# Patient Record
Sex: FEMALE | Race: WHITE | NOT HISPANIC OR LATINO | Employment: FULL TIME | URBAN - METROPOLITAN AREA
[De-identification: names, ages, dates, MRNs, and addresses within clinical notes are randomized per-mention and may not be internally consistent; named-entity substitution may affect disease eponyms.]

---

## 2017-01-03 ENCOUNTER — OFFICE VISIT (OUTPATIENT)
Dept: PSYCHOLOGY | Facility: CLINIC | Age: 52
End: 2017-01-03
Payer: COMMERCIAL

## 2017-01-03 DIAGNOSIS — F43.23 ADJUSTMENT DISORDER WITH MIXED ANXIETY AND DEPRESSED MOOD: Primary | ICD-10-CM

## 2017-01-03 PROCEDURE — 90846 FAMILY PSYTX W/O PT 50 MIN: CPT | Performed by: MARRIAGE & FAMILY THERAPIST

## 2017-01-03 NOTE — PROGRESS NOTES
"                                             Progress Note    Client Name: Elvia Howe  Date: 1/3/17         Service Type: Family without client present      Session Start Time: 7:07  Session End Time: 7:54      Session Length: 53+     Session #: 3     Attendees:  Thiago Wei and son Thiago Nicole    Treatment Plan Last Reviewed: n/a  PHQ-9 / HEATH-7 : not completed     DATA      Progress Since Last Session (Related to Symptoms / Goals / Homework):   Symptoms: Stable    Homework: n/a      Episode of Care Goals: Minimal progress - ACTION (Actively working towards change); Intervened by reinforcing change plan / affirming steps taken     Current / Ongoing Stressors and Concerns:   Client's  and son attended session.  Son arrived 20 minutes late which surprised  as he assumed son was not coming.  Both identified strain in the father/son relationship.  Father desires more shared positive experience and communication.  Son thinks this isn't an issue and identified believing that parents are hurt and upset that son didn't finish college.  Father denied this and said he has worked through the hurt and now is just wanting son to \"move forward\" in whatever way he chooses.     Treatment Objective(s) Addressed in This Session:   Assessment, relationship dynamics     Intervention:   Validation, hearing others' perspectives        ASSESSMENT: Current Emotional / Mental Status (status of significant symptoms):   Risk status (Self / Other harm or suicidal ideation)   Client not present in session to assess.        Medication Review:   No changes to current psychiatric medication(s)     Medication Compliance:   Yes     Changes in Health Issues:   None reported     Chemical Use Review:   Substance Use: Chemical use reviewed, no active concerns identified      Tobacco Use: No current tobacco use.       Collateral Reports Completed:   Not Applicable    PLAN: (Client Tasks / Therapist Tasks / Other)  Client will " work to listen and interrupt less.   will offer feedback to client about how this goes.      Sr. Thiago and Jr. Thiago will determine who attends next session.  They will consider ways to strengthen their relationship.       Marilee Isidro, LMFT                                                         ________________________________________________________________________    Treatment Plan    Client's Name: Elvia Howe  YOB: 1965    Date: 11/25/16    Diagnoses: Adjustment Disorders  309.28 (F43.23) With mixed anxiety and depressed mood  Psychosocial & Contextual Factors: family strain  WHODAS: 14    Referral / Collaboration:  Referral to another professional/service is not indicated at this time..    Anticipated number of session or this episode of care: 6-12      MeasurableTreatment Goal(s) related to diagnosis / functional impairment(s)  Goal 1: Client will report reduction in anxiety also as evidenced by reduction of HEATH-7 score below 6 points within the next 12 weeks.    Objective #A (Client Action)    Client will identify at least three triggers for anxiety.  Status: New - Date: 11/25/16     Intervention(s)  Therapist will provide educational materials on common triggers and signs of anxiety.    Objective #B  Client will use relaxation strategies at least two times per day to reduce the physical symptoms of anxiety.  Status: New - Date: 11/25/16     Intervention(s)  Therapist will teach relaxation strategies such as mindfulness, deep breathing, muscle relaxation, and sensory activities.    Objective #C  Client will use cognitive strategies identified in therapy to challenge anxious thoughts.  Status: New - Date: 11/25/16     Intervention(s)  Therapist will teach strategies for cognitive modification using REBT model.      Goal 2: Client will eliminate ineffective communication and verbal aggression with family members.    Objective #A (Client Action)    Client will practice the  use of timeouts.  Status: New: 11/25/16     Intervention(s)  Therapist will teach rules for taking a timeout. Practice/role-play in session.  Assign homework for practice.    Objective #B  Client will learn & utilize at least five assertive communication skills weekly.    Status: New: 11/25/16     Intervention(s)  Therapist will teach assertiveness skills. Role-play in session.    Objective #C  Client will track and record at least daily pleasant exchanges with family members.  Status: New - Date: 11/25/16     Intervention(s)  Therapist will assign homework for practice at home.      Client have reviewed and agreed to the above plan.      Marilee Isidro, LMFT  November 25, 2016

## 2017-01-06 ENCOUNTER — OFFICE VISIT (OUTPATIENT)
Dept: FAMILY MEDICINE | Facility: CLINIC | Age: 52
End: 2017-01-06
Payer: COMMERCIAL

## 2017-01-06 VITALS
HEART RATE: 76 BPM | DIASTOLIC BLOOD PRESSURE: 70 MMHG | TEMPERATURE: 98.6 F | SYSTOLIC BLOOD PRESSURE: 136 MMHG | HEIGHT: 64 IN | OXYGEN SATURATION: 98 %

## 2017-01-06 DIAGNOSIS — Z01.818 PREOP GENERAL PHYSICAL EXAM: Primary | ICD-10-CM

## 2017-01-06 DIAGNOSIS — M54.2 NECK PAIN: ICD-10-CM

## 2017-01-06 DIAGNOSIS — J45.40 MODERATE PERSISTENT ASTHMA, UNCOMPLICATED: ICD-10-CM

## 2017-01-06 DIAGNOSIS — F41.9 ANXIETY: ICD-10-CM

## 2017-01-06 DIAGNOSIS — F33.1 MAJOR DEPRESSIVE DISORDER, RECURRENT EPISODE, MODERATE (H): ICD-10-CM

## 2017-01-06 DIAGNOSIS — M92.61 HAGLUND'S DEFORMITY, RIGHT: ICD-10-CM

## 2017-01-06 LAB
BASOPHILS # BLD AUTO: 0 10E9/L (ref 0–0.2)
BASOPHILS NFR BLD AUTO: 0.2 %
DIFFERENTIAL METHOD BLD: NORMAL
EOSINOPHIL # BLD AUTO: 0.1 10E9/L (ref 0–0.7)
EOSINOPHIL NFR BLD AUTO: 1.9 %
ERYTHROCYTE [DISTWIDTH] IN BLOOD BY AUTOMATED COUNT: 12.8 % (ref 10–15)
HCT VFR BLD AUTO: 40.5 % (ref 35–47)
HGB BLD-MCNC: 13.8 G/DL (ref 11.7–15.7)
LYMPHOCYTES # BLD AUTO: 2.1 10E9/L (ref 0.8–5.3)
LYMPHOCYTES NFR BLD AUTO: 32.6 %
MCH RBC QN AUTO: 28.2 PG (ref 26.5–33)
MCHC RBC AUTO-ENTMCNC: 34.1 G/DL (ref 31.5–36.5)
MCV RBC AUTO: 83 FL (ref 78–100)
MONOCYTES # BLD AUTO: 0.5 10E9/L (ref 0–1.3)
MONOCYTES NFR BLD AUTO: 7.7 %
NEUTROPHILS # BLD AUTO: 3.7 10E9/L (ref 1.6–8.3)
NEUTROPHILS NFR BLD AUTO: 57.6 %
PLATELET # BLD AUTO: 200 10E9/L (ref 150–450)
RBC # BLD AUTO: 4.9 10E12/L (ref 3.8–5.2)
WBC # BLD AUTO: 6.4 10E9/L (ref 4–11)

## 2017-01-06 PROCEDURE — 36415 COLL VENOUS BLD VENIPUNCTURE: CPT | Performed by: FAMILY MEDICINE

## 2017-01-06 PROCEDURE — 99214 OFFICE O/P EST MOD 30 MIN: CPT | Performed by: FAMILY MEDICINE

## 2017-01-06 PROCEDURE — 85025 COMPLETE CBC W/AUTO DIFF WBC: CPT | Performed by: FAMILY MEDICINE

## 2017-01-06 RX ORDER — BUPROPION HYDROCHLORIDE 150 MG/1
150 TABLET ORAL EVERY MORNING
Qty: 90 TABLET | Refills: 1 | Status: SHIPPED | OUTPATIENT
Start: 2017-01-06 | End: 2017-03-07

## 2017-01-06 RX ORDER — CITALOPRAM HYDROBROMIDE 40 MG/1
40 TABLET ORAL DAILY
Qty: 90 TABLET | Refills: 1 | Status: SHIPPED | OUTPATIENT
Start: 2017-01-06 | End: 2017-03-10

## 2017-01-06 RX ORDER — ALBUTEROL SULFATE 90 UG/1
2 AEROSOL, METERED RESPIRATORY (INHALATION) EVERY 4 HOURS PRN
Qty: 6 INHALER | Refills: 0 | Status: SHIPPED | OUTPATIENT
Start: 2017-01-06 | End: 2018-01-12

## 2017-01-06 ASSESSMENT — ANXIETY QUESTIONNAIRES
1. FEELING NERVOUS, ANXIOUS, OR ON EDGE: NOT AT ALL
7. FEELING AFRAID AS IF SOMETHING AWFUL MIGHT HAPPEN: NOT AT ALL
5. BEING SO RESTLESS THAT IT IS HARD TO SIT STILL: NOT AT ALL
2. NOT BEING ABLE TO STOP OR CONTROL WORRYING: SEVERAL DAYS
IF YOU CHECKED OFF ANY PROBLEMS ON THIS QUESTIONNAIRE, HOW DIFFICULT HAVE THESE PROBLEMS MADE IT FOR YOU TO DO YOUR WORK, TAKE CARE OF THINGS AT HOME, OR GET ALONG WITH OTHER PEOPLE: NOT DIFFICULT AT ALL
3. WORRYING TOO MUCH ABOUT DIFFERENT THINGS: NOT AT ALL
GAD7 TOTAL SCORE: 2
6. BECOMING EASILY ANNOYED OR IRRITABLE: SEVERAL DAYS

## 2017-01-06 ASSESSMENT — PATIENT HEALTH QUESTIONNAIRE - PHQ9: 5. POOR APPETITE OR OVEREATING: NOT AT ALL

## 2017-01-06 NOTE — Clinical Note
My Depression Action Plan  Name: Elvia Howe   Date of Birth 1965  Date: 1/6/2017    My doctor: Josiah Salamanca   My clinic: [address] [address] [phone]          GREEN    ZONE   Good Control    What it looks like:     Things are going generally well. You have normal up s and down s. You may even feel depressed from time to time, but bad moods usually last less than a day.   What you need to do:  1. Continue to care for yourself (see self care plan)  2. Check your depression survival kit and update it as needed  3. Follow your physician s recommendations including any medication.  4. Do not stop taking medication unless you consult with your physician first.           YELLOW         ZONE Getting Worse    What it looks like:     Depression is starting to interfere with your life.     It may be hard to get out of bed; you may be starting to isolate yourself from others.    Symptoms of depression are starting to last most all day and this has happened for several days.     You may have suicidal thoughts but they are not constant.   What you need to do:     1. Call your care team, your response to treatment will improve if you keep your care team informed of your progress. Yellow periods are signs an adjustment may need to be made.     2. Continue your self-care, even if you have to fake it!    3. Talk to someone in your support network    4. Open up your depression survival kit           RED    ZONE Medical Alert - Get Help    What it looks like:     Depression is seriously interfering with your life.     You may experience these or other symptoms: You can t get out of bed most days, can t work or engage in other necessary activities, you have trouble taking care of basic hygiene, or basic responsibilities, thoughts of suicide or death that will not go away, self-injurious behavior.     What you need to do:  1. Call your care team and  request a same-day appointment. If they are not available (weekends or after hours) call your local crisis line, emergency room or 911.      Electronically signed by: TALIA NAVARRO, January 6, 2017    Depression Self Care Plan / Survival Kit    Self-Care for Depression  Here s the deal. Your body and mind are really not as separate as most people think.  What you do and think affects how you feel and how you feel influences what you do and think. This means if you do things that people who feel good do, it will help you feel better.  Sometimes this is all it takes.  There is also a place for medication and therapy depending on how severe your depression is, so be sure to consult with your medical provider and/ or Behavioral Health Consultant if your symptoms are worsening or not improving.     In order to better manage my stress, I will:    Exercise  Get some form of exercise, every day. This will help reduce pain and release endorphins, the  feel good  chemicals in your brain. This is almost as good as taking antidepressants!  This is not the same as joining a gym and then never going! (they count on that by the way ) It can be as simple as just going for a walk or doing some gardening, anything that will get you moving.      Hygiene   Maintain good hygiene (Get out of bed in the morning, Make your bed, Brush your teeth, Take a shower, and Get dressed like you were going to work, even if you are unemployed).  If your clothes don't fit try to get ones that do.    Diet  I will strive to eat foods that are good for me, drink plenty of water, and avoid excessive sugar, caffeine, alcohol, and other mood-altering substances.  Some foods that are helpful in depression are: complex carbohydrates, B vitamins, flaxseed, fish or fish oil, fresh fruits and vegetables.    Psychotherapy  I agree to participate in Individual Therapy (if recommended).    Medication  If prescribed medications, I agree to take them.  Missing doses  can result in serious side effects.  I understand that drinking alcohol, or other illicit drug use, may cause potential side effects.  I will not stop my medication abruptly without first discussing it with my provider.    Staying Connected With Others  I will stay in touch with my friends, family members, and my primary care provider/team.    Use your imagination  Be creative.  We all have a creative side; it doesn t matter if it s oil painting, sand castles, or mud pies! This will also kick up the endorphins.    Witness Beauty  (AKA stop and smell the roses) Take a look outside, even in mid-winter. Notice colors, textures. Watch the squirrels and birds.     Service to others  Be of service to others.  There is always someone else in need.  By helping others we can  get out of ourselves  and remember the really important things.  This also provides opportunities for practicing all the other parts of the program.    Humor  Laugh and be silly!  Adjust your TV habits for less news and crime-drama and more comedy.    Control your stress  Try breathing deep, massage therapy, biofeedback, and meditation. Find time to relax each day.     My support system    Clinic Contact:  Phone number:    Contact 1:  Phone number:    Contact 2:  Phone number:    [employer]/:  Phone number:    Therapist:  Phone number:    Local crisis center:    Phone number:    Other community support:  Phone number:

## 2017-01-06 NOTE — Clinical Note
My Asthma Action Plan  Name: Elvia Howe   YOB: 1965  Date: 1/6/2017   My doctor: Josiah Salamanca   My clinic: [facility]      My Control Medicine: Fluticasone+salmeterol (Advair) -           My Rescue Medicine: Albuterol (Proair/Ventolin/Proventil) HFA        My Asthma Severity: moderate persistent          GREEN ZONE   Good Control    I feel good    No cough or wheeze    Can work, sleep and play without asthma symptoms       Take your asthma control medicine every day.     1. If exercise triggers your asthma, take your rescue medication    15 minutes before exercise or sports, and    During exercise if you have asthma symptoms  2. Spacer to use with inhaler: If you have a spacer, make sure to use it with your inhaler             YELLOW ZONE Getting Worse  I have ANY of these:    I do not feel good    Cough or wheeze    Chest feels tight    Wake up at night   1. Keep taking your Green Zone medications  2. Start taking your rescue medicine:    every 20 minutes for up to 1 hour. Then every 4 hours for 24-48 hours.  3. If you stay in the Yellow Zone for more than 12-24 hours, contact your doctor.  4. If you do not return to the Green Zone in 12-24 hours or you get worse, start taking your oral steroid medicine if prescribed by your provider.           RED ZONE Medical Alert - Get Help  I have ANY of these:    I feel awful    Medicine is not helping    Breathing getting harder    Trouble walking or talking    Nose opens wide to breathe       1. Take your rescue medicine NOW  2. If your provider has prescribed an oral steroid medicine, start taking it NOW  3. Call your doctor NOW  4. If you are still in the Red Zone after 20 minutes and you have not reached your doctor:    Take your rescue medicine again and    Call 911 or go to the emergency room right away    See your regular doctor within 2 weeks of an Emergency Room or Urgent Care visit for follow-up treatment.             Electronically signed by: TALIA NAVARRO, January 6, 2017    Annual Reminders:  Meet with Asthma Educator,  Flu Shot in the Fall, consider Pneumonia Vaccination for patients with asthma (aged 19 and older).    Pharmacy: [facility] [address] [place] [address] [facility] AT [address]                    Asthma Triggers  How To Control Things That Make Your Asthma Worse    Triggers are things that make your asthma worse.  Look at the list below to help you find your triggers and what you can do about them.  You can help prevent asthma flare-ups by staying away from your triggers.      Trigger                                                          What you can do   Cigarette Smoke  Tobacco smoke can make asthma worse. Do not allow smoking in your home, car or around you.  Be sure no one smokes at a child s day care or school.  If you smoke, ask your health care provider for ways to help you quit.  Ask family members to quit too.  Ask your health care provider for a referral to Quit Plan to help you quit smoking, or call 0-095-491-PLAN.     Colds, Flu, Bronchitis  These are common triggers of asthma. Wash your hands often.  Don t touch your eyes, nose or mouth.  Get a flu shot every year.     Dust Mites  These are tiny bugs that live in cloth or carpet. They are too small to see. Wash sheets and blankets in hot water every week.   Encase pillows and mattress in dust mite proof covers.  Avoid having carpet if you can. If you have carpet, vacuum weekly.   Use a dust mask and HEPA vacuum.   Pollen and Outdoor Mold  Some people are allergic to trees, grass, or weed pollen, or molds. Try to keep your windows closed.  Limit time out doors when pollen count is high.   Ask you health care provider about taking medicine during allergy season.     Animal Dander  Some people are allergic to skin flakes, urine or saliva from pets with fur or feathers. Keep pets with fur or feathers out of  your home.    If you can t keep the pet outdoors, then keep the pet out of your bedroom.  Keep the bedroom door closed.  Keep pets off cloth furniture and away from stuffed toys.     Mice, Rats, and Cockroaches  Some people are allergic to the waste from these pests.   Cover food and garbage.  Clean up spills and food crumbs.  Store grease in the refrigerator.   Keep food out of the bedroom.   Indoor Mold  This can be a trigger if your home has high moisture. Fix leaking faucets, pipes, or other sources of water.   Clean moldy surfaces.  Dehumidify basement if it is damp and smelly.   Smoke, Strong Odors, and Sprays  These can reduce air quality. Stay away from strong odors and sprays, such as perfume, powder, hair spray, paints, smoke incense, paint, cleaning products, candles and new carpet.   Exercise or Sports  Some people with asthma have this trigger. Be active!  Ask your doctor about taking medicine before sports or exercise to prevent symptoms.    Warm up for 5-10 minutes before and after sports or exercise.     Other Triggers of Asthma  Cold air:  Cover your nose and mouth with a scarf.  Sometimes laughing or crying can be a trigger.  Some medicines and food can trigger asthma.

## 2017-01-06 NOTE — MR AVS SNAPSHOT
After Visit Summary   1/6/2017    Elvia Howe    MRN: [id number]           Patient Information     Date Of Birth          1965        Visit Information        Provider Department      1/6/2017 2:30 PM Josiah Salamanca MD [facility]        Today's Diagnoses     Preop general physical exam    -  1     Kimmy's deformity, right         Anxiety         Major depressive disorder, recurrent episode, moderate (H)         Moderate persistent asthma, uncomplicated         Neck pain           Care Instructions    1. Do not take Fish Oil, NSAID's like Aspirin, Ibuprofen, Naproxen etc, one week prior to your surgery. You may resume these after your surgery.    2. Continue your other medications but on the day of surgery use only a sip of water to take these.    3. Come back to see me after you heal from surgery to discuss weight and other issues. If you come fasting we can do some labs.         Follow-ups after your visit        Your next 10 appointments already scheduled     Feb 01, 2017  7:00 AM   Return Visit with KAYLYNN Barron   [facility] ([facility])    [address] [facility] [address] [phone]              Who to contact     If you have questions or need follow up information about today's clinic visit or your schedule please contact [facility] directly at [phone].  Normal or non-critical lab and imaging results will be communicated to you by MyChart, letter or phone within 4 business days after the clinic has received the results. If you do not hear from us within 7 days, please contact the clinic through MyChart or phone. If you have a critical or abnormal lab result, we will notify you by phone as soon as possible.  Submit refill requests through [employer] or call your pharmacy and they will forward the refill request to us. Please allow 3 business days for your refill to be completed.          Additional  "Information About Your Visit        MyChart Information     [facility] lets you send messages to your doctor, view your test results, renew your prescriptions, schedule appointments and more. To sign up, go to [email or web address] . Click on \"Log in\" on the left side of the screen, which will take you to the Welcome page. Then click on \"Sign up Now\" on the right side of the page.     You will be asked to enter the access code listed below, as well as some personal information. Please follow the directions to create your username and password.     Your access code is: [email or web address]  Expires: 2017  3:18 PM     Your access code will  in 90 days. If you need help or a new code, please call your [facility] clinic or [phone].        Care EveryWhere ID     This is your Care EveryWhere ID. This could be used by other organizations to access your [facility] medical records  [id number]        Your Vitals Were     Pulse Temperature Height Pulse Oximetry          76 98.6  F (37  C) (Oral) 5' 3.75\" (1.619 m) 98%         Blood Pressure from Last 3 Encounters:   17 136/70   16 131/78   09/24/15 128/76    Weight from Last 3 Encounters:   16 230 lb (104.327 kg)   09/24/15 242 lb (109.77 kg)   08/03/15 260 lb (117.935 kg)              We Performed the Following     Asthma Action Plan (AAP)     CBC with platelets differential          Today's Medication Changes          These changes are accurate as of: 17  3:18 PM.  If you have any questions, ask your nurse or doctor.               Start taking these medicines.        Dose/Directions    tiZANidine 4 MG tablet   Commonly known as:  ZANAFLEX   Used for:  Neck pain   Started by:  Josiah Salamanca MD        Dose:  4 mg   Take 1 tablet (4 mg) by mouth 2 times daily as needed for muscle spasms   Quantity:  60 tablet   Refills:  3         These medicines have changed or have updated prescriptions.        Dose/Directions    buPROPion 150 MG 24 hr tablet "   Commonly known as:  WELLBUTRIN XL   This may have changed:  additional instructions   Used for:  Major depressive disorder, recurrent episode, moderate (H), Anxiety   Changed by:  Josiah Salamanca MD        Dose:  150 mg   Take 1 tablet (150 mg) by mouth every morning   Quantity:  90 tablet   Refills:  1       citalopram 40 MG tablet   Commonly known as:  celeXA   This may have changed:  additional instructions   Used for:  Anxiety, Major depressive disorder, recurrent episode, moderate (H)   Changed by:  Josiah Salamanca MD        Dose:  40 mg   Take 1 tablet (40 mg) by mouth daily   Quantity:  90 tablet   Refills:  1       fluticasone-salmeterol 250-50 MCG/DOSE diskus inhaler   Commonly known as:  ADVAIR DISKUS   This may have changed:  additional instructions   Used for:  Moderate persistent asthma, uncomplicated   Changed by:  Josiah Salamanca MD        Dose:  1 puff   Inhale 1 puff into the lungs every 12 hours   Quantity:  1 Inhaler   Refills:  3         Stop taking these medicines if you haven't already. Please contact your care team if you have questions.     phentermine 37.5 MG capsule   Stopped by:  Josiah Salamanca MD           scopolamine 72 hr patch   Commonly known as:  TRANSDERM   Stopped by:  Josiah Salamanca MD                Where to get your medicines      These medications were sent to [facility] [address] [place] [address] [address] [place] [address] AT [facility]  [address] [facility] [address]     Phone:  [phone]    - buPROPion 150 MG 24 hr tablet  - citalopram 40 MG tablet  - fluticasone-salmeterol 250-50 MCG/DOSE diskus inhaler  - tiZANidine 4 MG tablet      Some of these will need a paper prescription and others can be bought over the counter.  Ask your nurse if you have questions.     Bring a paper prescription for each of these medications    - albuterol 108 (90 BASE) MCG/ACT Inhaler             Primary Care Provider Office Phone # Fax #     Josiah Salamanca [phone]       [facility] [address] [facility] [address]        Thank you!     Thank you for choosing [facility]  for your care. Our goal is always to provide you with excellent care. Hearing back from our patients is one way we can continue to improve our services. Please take a few minutes to complete the written survey that you may receive in the mail after your visit with us. Thank you!             Your Updated Medication List - Protect others around you: Learn how to safely use, store and throw away your medicines at www.disposemymeds.org.          This list is accurate as of: 1/6/17  3:18 PM.  Always use your most recent med list.                   Brand Name Dispense Instructions for use    albuterol 108 (90 BASE) MCG/ACT Inhaler    PROAIR HFA/PROVENTIL HFA/VENTOLIN HFA    6 Inhaler    Inhale 2 puffs into the lungs every 4 hours as needed for shortness of breath / dyspnea or wheezing       ALBUTEROL IN          buPROPion 150 MG 24 hr tablet    WELLBUTRIN XL    90 tablet    Take 1 tablet (150 mg) by mouth every morning       citalopram 40 MG tablet    celeXA    90 tablet    Take 1 tablet (40 mg) by mouth daily       fluticasone-salmeterol 250-50 MCG/DOSE diskus inhaler    ADVAIR DISKUS    1 Inhaler    Inhale 1 puff into the lungs every 12 hours       loratadine 10 MG capsule     90 capsule    Take 10 mg by mouth daily       MODAFINIL PO          order for DME     1 each    Equipment being ordered: cpap tubing, mask, filters, and head gear for lifetime.       tiZANidine 4 MG tablet    ZANAFLEX    60 tablet    Take 1 tablet (4 mg) by mouth 2 times daily as needed for muscle spasms

## 2017-01-06 NOTE — PATIENT INSTRUCTIONS
1. Do not take Fish Oil, NSAID's like Aspirin, Ibuprofen, Naproxen etc, one week prior to your surgery. You may resume these after your surgery.    2. Continue your other medications but on the day of surgery use only a sip of water to take these.    3. Come back to see me after you heal from surgery to discuss weight and other issues. If you come fasting we can do some labs.

## 2017-01-06 NOTE — PROGRESS NOTES
[facility]  [address] [facility] [address]  [phone]  Dept: [phone]    PRE-OP EVALUATION:  Today's date: 2017    Elvia Howe (: 1965) presents for pre-operative evaluation assessment as requested by Dr. Dyer.  She requires evaluation and anesthesia risk assessment prior to undergoing surgery/procedure for treatment of right heel.     Date of Surgery/ Procedure: 2017  Time of Surgery/ Procedure:   Hospital/Surgical Facility: [facility] [place]  Fax number for surgical facility: [phone]  Primary Physician: Josiah Salamanca  Type of Anesthesia Anticipated: to be determined    Patient has a Health Care Directive or Living Will:  NO    1. NO - Do you have a history of heart attack, stroke, stent, bypass or surgery on an artery in the head, neck, heart or legs?  2. NO - Do you ever have any pain or discomfort in your chest?  3. NO - Do you have a history of  Heart Failure?  4. NO - Are you troubled by shortness of breath when: walking on the level, up a slight hill or at night?  5. NO - Do you currently have a cold, bronchitis or other respiratory infection?  6. NO - Do you have a cough, shortness of breath or wheezing?  7. NO - Do you sometimes get pains in the calves of your legs when you walk?  8. NO - Do you or anyone in your family have previous history of blood clots?  9. NO - Do you or does anyone in your family have a serious bleeding problem such as prolonged bleeding following surgeries or cuts?  10. NO - Have you ever had problems with anemia or been told to take iron pills?  11. NO - Have you had any abnormal blood loss such as black, tarry or bloody stools, or abnormal vaginal bleeding?  12. yes - Have you ever had a blood transfusion?  13. NO - Have you or any of your relatives ever had problems with anesthesia?  14. yes - Do you have sleep apnea, excessive snoring or daytime drowsiness?  15. NO - Do you have any prosthetic heart  valves?  16. NO - Do you have prosthetic joints?  17. NO - Is there any chance that you may be pregnant?      HPI:                                                      Right Kimmy deformity - she has had pain and swelling for many months. She is now scheduled for surgery on 1/18/17.    Obesity - diet and exercise discussed. Declines referral to nutrition. Phentermine previously every 2 days was working well. If she takes it daily then she can't think clearly. She would not let us weigh her today.    Wt Readings from Last 5 Encounters:   03/09/16 230 lb (104.327 kg)   09/24/15 242 lb (109.77 kg)   08/03/15 260 lb (117.935 kg)   08/28/13 255 lb (115.667 kg)   08/23/13 250 lb (113.399 kg)     07/29/13  258 lb (117.028 kg)      01/24/13   276 lb (125.193 kg)       Asthma -     Duration: since around 2002  Severity: moderate persistent  Asthma symptoms: Cough, shortness of breath, wheezing.  H/O hospitalization: no  H/O steroid treatment: yes  ACT every 6 months: 19 today 8/3/15  Yearly Action plan: 8/3/15  Smoker: no  Rescue inhaler use: rarely  Preventive treatment: Advair 250/50, one bid - gets from [facility] - but has been out of this recently  Side effects: denies  Triggers: environmental allergies  Compliant: yes  Counseling: previously done today about the detriments of uncontrolled asthma, proper inhaler technique.    ACT Total Scores 3/9/2016   ACT TOTAL SCORE -   ASTHMA ER VISITS -   ASTHMA HOSPITALIZATIONS -   ACT TOTAL SCORE (Goal Greater than or Equal to 20) 22   In the past 12 months, how many times did you visit the emergency room for your asthma without being admitted to the hospital? 0   In the past 12 months, how many times were you hospitalized overnight because of your asthma? 0         Environmental Allergies - her symptoms are year round and include itchy, runny eyes, nose, sneezing. Loratadine helps. Zyrtec and Singulair no help. I had referred her to allergy but she did not go.      Anxiety/depression - Fair control on Celexa 40 mg qd and Wellbutrin 150 mg qd. No longer on Buspar - not needed. No side effects.     PHQ-9 SCORE 8/3/2015 3/9/2016 1/6/2017   Total Score 8 - -   Total Score - 3 0     HEATH-7 SCORE 8/3/2015 3/9/2016 1/6/2017   Total Score 9 - -   Total Score - 4 2       Sleep apnea - stable on CPAP at night. Follows with sleep medicine.    Neck pain - present a couple of weeks without trauma. She would like to try muscle relaxer.    Knee pain - had steroid shots per ortho.     Fatigue and insomnia - has seen seen by sleep. Provigil is working well.      Vit D 2000 units per day but has not been taking it. I encouraged her to do that.     Diarrhea - She tells me her symptoms have resolved since using probiotics. Not needing any more.    Previous h/o sea sickness - Scop patch prn helps.      Preventive -    Immunization History   Administered Date(s) Administered     Hepatitis B 06/13/2011, 11/08/2011     Influenza (IIV3) 10/08/2012     Influenza Vaccine IM 3yrs+ 4 Valent IIV4 12/11/2014, 11/04/2015, 11/09/2016     Pneumococcal 23 valent 11/08/2011, 10/08/2012     TDAP (ADACEL AGES 11-64) 06/13/2011     She recalls that she [facility]. She declines another mammogram.    Lipids screen:    Recent Labs   Lab Test  09/24/15   1117  08/28/13   1119   CHOL  172  169   HDL  51  50   LDL  102  105   TRIG  95  69   CHOLHDLRATIO  3.4  3.4       PAP - has had hysterectomy, still has cervix - declines PAP.    SH:    Marital status:   Kids: 2  Employment: life insurance exams  Exercise: swimming 4 days per week for 30 minutes per time.  Tobacco: no  Etoh: 1 every 2 months  Recreational drugs: denies  Caffeine: 3 pops per day.      MEDICAL HISTORY:                                                      Patient Active Problem List    Diagnosis Date Noted     Sleep apnea, unspecified type 11/03/2016     Priority: Medium     Adjustment disorder with  mixed anxiety and depressed mood 2016     Priority: Medium     Motion sickness, initial encounter 2016     Priority: Medium     Obesity 2015     Priority: Medium     Moderate major depression (H) 2014     Priority: Medium     Environmental allergies 2012     Priority: Medium     Epicondylitis, lateral humeral 2012     Priority: Medium     Anxiety 2011     Priority: Medium     Moderate persistent asthma 2011     Priority: Medium     CARDIOVASCULAR SCREENING; LDL GOAL LESS THAN 160 10/31/2010     Priority: Medium      Past Medical History   Diagnosis Date     Anemia      Obesity      Sleep apnea, obstructive      CPAP     Anxiety      Asthma      Heart murmur      echo  mild mitral and mild tricuspid regurgitation otherwise normal     Claustrophobia      Arthritis      Past Surgical History   Procedure Laterality Date     C/section, low transverse       , Low Transverse x2     Carpal tunnel release rt/lt       Surgical history of -        wisdom teeth     Hysterectomy, pap no longer indicated       fibroids, one ovary remains.     Current Outpatient Prescriptions   Medication Sig Dispense Refill     citalopram (CELEXA) 40 MG tablet Take 1 tablet (40 mg) by mouth daily Appointment needed for further refills. 30 tablet 0     buPROPion (WELLBUTRIN XL) 150 MG 24 hr tablet Take 1 tablet (150 mg) by mouth every morning Appointment needed for further refills. 30 tablet 0     order for DME Equipment being ordered: cpap tubing, mask, filters, and head gear for lifetime. 1 each 0     fluticasone-salmeterol (ADVAIR DISKUS) 250-50 MCG/DOSE diskus inhaler Inhale 1 puff into the lungs every 12 hours Needs office visit for further refill. 1 Inhaler 0     MODAFINIL PO        loratadine 10 MG capsule Take 10 mg by mouth daily 90 capsule 3     scopolamine (TRANSDERM) (1.5mg base/3day) patch Place 1 patch onto the skin every 72 hours.  Apply to hairless area behind one ear  "at least 4 hours before travel. 4 patch 0     phentermine 37.5 MG capsule Take 1 capsule (37.5 mg) by mouth every morning 30 capsule 1     albuterol (PROAIR HFA, PROVENTIL HFA, VENTOLIN HFA) 108 (90 BASE) MCG/ACT inhaler Inhale 2 puffs into the lungs every 4 hours as needed for shortness of breath / dyspnea or wheezing 6 Inhaler 0     ALBUTEROL IN        OTC products: None, except as noted above    Allergies   Allergen Reactions     Nkda [No Known Drug Allergies]       Latex Allergy: NO    Social History   Substance Use Topics     Smoking status: Never Smoker      Smokeless tobacco: Never Used     Alcohol Use: Yes      Comment: OCC     History   Drug Use No       REVIEW OF SYSTEMS:                                                    C: NEGATIVE for fever, chills, change in weight  E/M: NEGATIVE for ear, mouth and throat problems  R: NEGATIVE for significant cough or SOB  CV: NEGATIVE for chest pain, palpitations or peripheral edema    EXAM:                                                    /70 mmHg  Pulse 76  Temp(Src) 98.6  F (37  C) (Oral)  Ht 5' 3.75\" (1.619 m)  Wt   SpO2 98%  GENERAL APPEARANCE: healthy, alert and no distress  HENT: ear canals and TM's normal and nose and mouth without ulcers or lesions  RESP: lungs clear to auscultation - no rales, rhonchi or wheezes  CV: regular rate and rhythm, normal S1 S2, no S3 or S4 and no murmur, click or rub   ABDOMEN: soft, nontender, no HSM or masses and bowel sounds normal  NEURO: Normal strength and tone, sensory exam grossly normal, mentation intact and speech normal    DIAGNOSTICS:                                                      Results for orders placed or performed in visit on 01/06/17   CBC with platelets differential   Result Value Ref Range    WBC 6.4 4.0 - 11.0 10e9/L    RBC Count 4.90 3.8 - 5.2 10e12/L    Hemoglobin 13.8 11.7 - 15.7 g/dL    Hematocrit 40.5 35.0 - 47.0 %    MCV 83 78 - 100 fl    MCH 28.2 26.5 - 33.0 pg    MCHC 34.1 31.5 - " 36.5 g/dL    RDW 12.8 10.0 - 15.0 %    Platelet Count 200 150 - 450 10e9/L    Diff Method Automated Method     % Neutrophils 57.6 %    % Lymphocytes 32.6 %    % Monocytes 7.7 %    % Eosinophils 1.9 %    % Basophils 0.2 %    Absolute Neutrophil 3.7 1.6 - 8.3 10e9/L    Absolute Lymphocytes 2.1 0.8 - 5.3 10e9/L    Absolute Monocytes 0.5 0.0 - 1.3 10e9/L    Absolute Eosinophils 0.1 0.0 - 0.7 10e9/L    Absolute Basophils 0.0 0.0 - 0.2 10e9/L     Last Basic Metabolic Panel:  NA      137   9/24/2015   POTASSIUM      4.6   9/24/2015  CHLORIDE      103   9/24/2015  JEWELS      8.8   9/24/2015  CO2       30   9/24/2015  BUN       17   9/24/2015  CR     0.86   9/24/2015  GLC       86   9/24/2015    IMPRESSION:                                                    Reason for surgery/procedure: Kimmy deformity    The proposed surgical procedure is considered INTERMEDIATE risk.    REVISED CARDIAC RISK INDEX  The patient has the following serious cardiovascular risks for perioperative complications such as (MI, PE, VFib and 3  AV Block):  No serious cardiac risks  INTERPRETATION: 0 risks: Class I (very low risk - 0.4% complication rate)    The patient has the following additional risks for perioperative complications:  No identified additional risks      RECOMMENDATIONS:                                                      Assessment and Plan - Decision Making    1. Preop general physical exam  No contraindications to surgery. Approval given for surgery with no further evaluation.  - CBC with platelets differential    2. Kimmy's deformity, right  Same as above.    3. Anxiety  Refilled.   - citalopram (CELEXA) 40 MG tablet; Take 1 tablet (40 mg) by mouth daily  Dispense: 90 tablet; Refill: 1  - buPROPion (WELLBUTRIN XL) 150 MG 24 hr tablet; Take 1 tablet (150 mg) by mouth every morning  Dispense: 90 tablet; Refill: 1    4. Major depressive disorder, recurrent episode, moderate (H)  Refilled.  - citalopram (CELEXA) 40 MG tablet; Take  1 tablet (40 mg) by mouth daily  Dispense: 90 tablet; Refill: 1  - buPROPion (WELLBUTRIN XL) 150 MG 24 hr tablet; Take 1 tablet (150 mg) by mouth every morning  Dispense: 90 tablet; Refill: 1    5. Moderate persistent asthma, uncomplicated  Refilled.  - fluticasone-salmeterol (ADVAIR DISKUS) 250-50 MCG/DOSE diskus inhaler; Inhale 1 puff into the lungs every 12 hours  Dispense: 1 Inhaler; Refill: 3  - Asthma Action Plan (AAP)  - albuterol (PROAIR HFA/PROVENTIL HFA/VENTOLIN HFA) 108 (90 BASE) MCG/ACT Inhaler; Inhale 2 puffs into the lungs every 4 hours as needed for shortness of breath / dyspnea or wheezing  Dispense: 6 Inhaler; Refill: 0    6. Neck pain    - tiZANidine (ZANAFLEX) 4 MG tablet; Take 1 tablet (4 mg) by mouth 2 times daily as needed for muscle spasms  Dispense: 60 tablet; Refill: 3      Written instructions given as follows:    Patient Instructions   1. Do not take Fish Oil, NSAID's like Aspirin, Ibuprofen, Naproxen etc, one week prior to your surgery. You may resume these after your surgery.    2. Continue your other medications but on the day of surgery use only a sip of water to take these.    3. Come back to see me after you heal from surgery to discuss weight and other issues. If you come fasting we can do some labs.              Signed Electronically by: TALIA NAVARRO MD    Copy of this evaluation report is provided to requesting physician.    [place] Preop Guidelines

## 2017-01-06 NOTE — NURSING NOTE
"Chief Complaint   Patient presents with     Pre-Op Exam       Initial /88 mmHg  Pulse 76  Temp(Src) 98.6  F (37  C) (Oral)  Ht 5' 3.75\" (1.619 m)  Wt   SpO2 98% Estimated body mass index is 39.80 kg/(m^2) as calculated from the following:    Height as of this encounter: 5' 3.75\" (1.619 m).    Weight as of 3/9/16: 230 lb (104.327 kg)..  BP completed using cuff size: forrest Santana LPN    "

## 2017-01-07 ASSESSMENT — ANXIETY QUESTIONNAIRES: GAD7 TOTAL SCORE: 2

## 2017-01-07 ASSESSMENT — PATIENT HEALTH QUESTIONNAIRE - PHQ9: SUM OF ALL RESPONSES TO PHQ QUESTIONS 1-9: 0

## 2017-01-12 ENCOUNTER — TELEPHONE (OUTPATIENT)
Dept: FAMILY MEDICINE | Facility: CLINIC | Age: 52
End: 2017-01-12

## 2017-01-12 NOTE — Clinical Note
[facility]  [address] [facility] [address] [phone]    January 12, 2017      Elvia Howe  [address] [facility] [address]      Dear Elvia,     Your clinic record indicates that you are due for an asthma update. We have a survey tool called an ACT (or Asthma Control Test) we use to measure the level of control of your asthma. Please complete the enclosed questionnaire and mail it back to us in the self-addressed stamped envelope.     If you have questions about this letter please contact your provider.     Sincerely,       Your [facility] Team

## 2017-01-13 ENCOUNTER — TELEPHONE (OUTPATIENT)
Dept: FAMILY MEDICINE | Facility: CLINIC | Age: 52
End: 2017-01-13

## 2017-01-23 ENCOUNTER — TELEPHONE (OUTPATIENT)
Dept: FAMILY MEDICINE | Facility: CLINIC | Age: 52
End: 2017-01-23

## 2017-01-24 ASSESSMENT — ASTHMA QUESTIONNAIRES: ACT_TOTALSCORE: 16

## 2017-01-25 NOTE — TELEPHONE ENCOUNTER
Office visit needed to manage uncontrolled asthma.    Josiah Salamanca M.D.      ACT Total Scores 8/3/2015 3/9/2016 1/23/2017   ACT TOTAL SCORE 19 - -   ASTHMA ER VISITS 0 = None - -   ASTHMA HOSPITALIZATIONS 0 = None - -   ACT TOTAL SCORE (Goal Greater than or Equal to 20) - 22 16   In the past 12 months, how many times did you visit the emergency room for your asthma without being admitted to the hospital? - 0 0   In the past 12 months, how many times were you hospitalized overnight because of your asthma? - 0 0

## 2017-01-25 NOTE — TELEPHONE ENCOUNTER
Called and informed patient. She will call back to make an appointment.Chelita Villagran MA/SHERIN

## 2017-02-01 ENCOUNTER — OFFICE VISIT (OUTPATIENT)
Dept: PSYCHOLOGY | Facility: CLINIC | Age: 52
End: 2017-02-01
Payer: COMMERCIAL

## 2017-02-01 DIAGNOSIS — F43.23 ADJUSTMENT DISORDER WITH MIXED ANXIETY AND DEPRESSED MOOD: Primary | ICD-10-CM

## 2017-02-01 PROCEDURE — 90847 FAMILY PSYTX W/PT 50 MIN: CPT | Performed by: MARRIAGE & FAMILY THERAPIST

## 2017-02-01 NOTE — PROGRESS NOTES
"                                             Progress Note    Client Name: Elvia Howe  Date: 2/1/17         Service Type: Family with client present      Session Start Time: 7:03  Session End Time: 7:58      Session Length: 53+     Session #: 4     Attendees: Client and son, Thiago Nicole    Treatment Plan Last Reviewed: n/a  PHQ-9 / HEATH-7 : not completed     DATA      Progress Since Last Session (Related to Symptoms / Goals / Homework):   Symptoms: Stable    Homework: n/a      Episode of Care Goals: Minimal progress - ACTION (Actively working towards change); Intervened by reinforcing change plan / affirming steps taken     Current / Ongoing Stressors and Concerns:   Client and son attended session.  Son arrived late.  Mother hoped for increased understanding of what was impeding their relationship.  Son identified resentments about mother's feedback during his wrestling career.  It seems both become defensive with feedback from the other as it's perceived as implying that someone knows more.  Client displays anxiety in her desire for a better relationship which, at times, seems to emotionally \"smother\" son.  Mother identifies her awareness of this yet struggles to minimize it.     Treatment Objective(s) Addressed in This Session:   Client will track and record at least daily pleasant exchanges with family members.     Intervention:   Validation, hearing others' perspectives        ASSESSMENT: Current Emotional / Mental Status (status of significant symptoms):   Risk status (Self / Other harm or suicidal ideation)  Client denies current fears or concerns for personal safety.   Client denies current or recent suicidal ideation or behaviors.   Client denies current or recent homicidal ideation or behaviors.   Client denies current or recent self injurious behavior or ideation.   Client denies other safety concerns.   A safety and risk management plan has not been developed at this time, however client was given " the after-hours number / 911 should there be a change in any of these risk factors.     Appearance:   Appropriate    Eye Contact:   Fair    Psychomotor Behavior: Restless    Attitude:   Cooperative    Orientation:   All   Speech    Rate / Production: Normal     Volume:  Normal    Mood:    Anxious    Affect:    Appropriate  Bright    Thought Content:  Clear    Thought Form:  Coherent  Logical    Insight:    Fair        Medication Review:   No changes to current psychiatric medication(s)     Medication Compliance:   Yes     Changes in Health Issues:   None reported     Chemical Use Review:   Substance Use: Chemical use reviewed, no active concerns identified      Tobacco Use: No current tobacco use.       Collateral Reports Completed:   Not Applicable    PLAN: (Client Tasks / Therapist Tasks / Other)  1.  Client will increase awareness of her delivery of feedback and statements to son.  2.  Son will provide mother with verbal check-in at end of day when he gets home.    KAYLYNN Baptiste                                                         ________________________________________________________________________    Treatment Plan    Client's Name: Elvia Howe  YOB: 1965    Date: 11/25/16    Diagnoses: Adjustment Disorders  309.28 (F43.23) With mixed anxiety and depressed mood  Psychosocial & Contextual Factors: family strain  WHODAS: 14    Referral / Collaboration:  Referral to another professional/service is not indicated at this time..    Anticipated number of session or this episode of care: 6-12      MeasurableTreatment Goal(s) related to diagnosis / functional impairment(s)  Goal 1: Client will report reduction in anxiety also as evidenced by reduction of HEATH-7 score below 6 points within the next 12 weeks.    Objective #A (Client Action)    Client will identify at least three triggers for anxiety.  Status: New - Date: 11/25/16     Intervention(s)  Therapist will provide  educational materials on common triggers and signs of anxiety.    Objective #B  Client will use relaxation strategies at least two times per day to reduce the physical symptoms of anxiety.  Status: New - Date: 11/25/16     Intervention(s)  Therapist will teach relaxation strategies such as mindfulness, deep breathing, muscle relaxation, and sensory activities.    Objective #C  Client will use cognitive strategies identified in therapy to challenge anxious thoughts.  Status: New - Date: 11/25/16     Intervention(s)  Therapist will teach strategies for cognitive modification using REBT model.      Goal 2: Client will eliminate ineffective communication and verbal aggression with family members.    Objective #A (Client Action)    Client will practice the use of timeouts.  Status: New: 11/25/16     Intervention(s)  Therapist will teach rules for taking a timeout. Practice/role-play in session.  Assign homework for practice.    Objective #B  Client will learn & utilize at least five assertive communication skills weekly.    Status: New: 11/25/16     Intervention(s)  Therapist will teach assertiveness skills. Role-play in session.    Objective #C  Client will track and record at least daily pleasant exchanges with family members.  Status: New - Date: 11/25/16     Intervention(s)  Therapist will assign homework for practice at home.      Client have reviewed and agreed to the above plan.        Marilee Isidro, LMFT  November 25, 2016

## 2017-02-08 ENCOUNTER — TELEPHONE (OUTPATIENT)
Dept: FAMILY MEDICINE | Facility: CLINIC | Age: 52
End: 2017-02-08

## 2017-02-08 NOTE — Clinical Note
[facility]  [address] [facility] [address] [phone]    February 9, 2017      Elvia Howe  [address] [facility] [address]      Dear Elvia,     Your clinic record indicates that you are due for an asthma update. We have a survey tool called an ACT (or Asthma Control Test) we use to measure the level of control of your asthma. Please complete the enclosed questionnaire and mail it back to us in the self-addressed stamped envelope.     If you have questions about this letter please contact your provider.     Sincerely,       Your [facility] Team

## 2017-02-09 NOTE — TELEPHONE ENCOUNTER
Panel Management Review      Patient has the following on her problem list:     Asthma review     ACT Total Scores 1/23/2017   ACT TOTAL SCORE -   ASTHMA ER VISITS -   ASTHMA HOSPITALIZATIONS -   ACT TOTAL SCORE (Goal Greater than or Equal to 20) 16   In the past 12 months, how many times did you visit the emergency room for your asthma without being admitted to the hospital? 0   In the past 12 months, how many times were you hospitalized overnight because of your asthma? 0      1. Is Asthma diagnosis on the Problem List? Yes    2. Is Asthma listed on Health Maintenance? Yes    3. Patient is due for:  ACT  -     Composite cancer screening  Chart review shows that this patient is due/due soon for the following None  Summary:    Patient is due/failing the following:   ACT    Action needed:   Patient needs to do ACT.    Type of outreach:    routed to TC to send pt. ACT    Questions for provider review:    None                                                                                                                                    Jaymie Royal M.A.       Chart routed to Care Team .

## 2017-02-20 ENCOUNTER — TELEPHONE (OUTPATIENT)
Dept: FAMILY MEDICINE | Facility: CLINIC | Age: 52
End: 2017-02-20

## 2017-02-21 ASSESSMENT — ASTHMA QUESTIONNAIRES: ACT_TOTALSCORE: 25

## 2017-02-28 ENCOUNTER — OFFICE VISIT (OUTPATIENT)
Dept: PSYCHOLOGY | Facility: CLINIC | Age: 52
End: 2017-02-28
Payer: COMMERCIAL

## 2017-02-28 DIAGNOSIS — F43.23 ADJUSTMENT DISORDER WITH MIXED ANXIETY AND DEPRESSED MOOD: Primary | ICD-10-CM

## 2017-02-28 PROCEDURE — 90834 PSYTX W PT 45 MINUTES: CPT | Performed by: MARRIAGE & FAMILY THERAPIST

## 2017-02-28 ASSESSMENT — ANXIETY QUESTIONNAIRES
1. FEELING NERVOUS, ANXIOUS, OR ON EDGE: SEVERAL DAYS
3. WORRYING TOO MUCH ABOUT DIFFERENT THINGS: NOT AT ALL
5. BEING SO RESTLESS THAT IT IS HARD TO SIT STILL: NOT AT ALL
6. BECOMING EASILY ANNOYED OR IRRITABLE: SEVERAL DAYS
2. NOT BEING ABLE TO STOP OR CONTROL WORRYING: NOT AT ALL
GAD7 TOTAL SCORE: 2
7. FEELING AFRAID AS IF SOMETHING AWFUL MIGHT HAPPEN: NOT AT ALL

## 2017-02-28 ASSESSMENT — PATIENT HEALTH QUESTIONNAIRE - PHQ9: 5. POOR APPETITE OR OVEREATING: NOT AT ALL

## 2017-02-28 NOTE — MR AVS SNAPSHOT
"                  MRN:[id number]                      After Visit Summary   2017    Elvia Howe    MRN: [id number]           Visit Information        Provider Department      2017 7:00 AM Marilee Isidro LMFT [facility]      Your next 10 appointments already scheduled     2017  9:00 AM CDT   Return Visit with KAYLYNN Barron   [facility]    [address] [facility] [address] [phone]            May 02, 2017  7:00 AM CDT   Return Visit with Marilee Fanman, LMFT   [facility]    [address] [facility] [address] [phone]              MyChart Information     [employer] lets you send messages to your doctor, view your test results, renew your prescriptions, schedule appointments and more. To sign up, go to [email or web address] . Click on \"Log in\" on the left side of the screen, which will take you to the Welcome page. Then click on \"Sign up Now\" on the right side of the page.     You will be asked to enter the access code listed below, as well as some personal information. Please follow the directions to create your username and password.     Your access code is: [email or web address]  Expires: 2017  3:18 PM     Your access code will  in 90 days. If you need help or a new code, please call your [place] clinic or [phone].        Care EveryWhere ID     This is your Care EveryWhere ID. This could be used by other organizations to access your [place] medical records  [id number]        "

## 2017-02-28 NOTE — PROGRESS NOTES
Progress Note    Client Name: Elvia Howe  Date: 2/28/17         Service Type: Individual      Session Start Time: 7:00  Session End Time: 7:52      Session Length: 38-52     Session #: 5     Attendees: Client    Treatment Plan Last Reviewed: n/a  PHQ-9 / HEATH-7 : completed     DATA      Progress Since Last Session (Related to Symptoms / Goals / Homework):   Symptoms: Stable    Homework: n/a      Episode of Care Goals: Minimal progress - ACTION (Actively working towards change); Intervened by reinforcing change plan / affirming steps taken     Current / Ongoing Stressors and Concerns:   Client attended session.  Son did not show up as planned.  She notices some small movements in relationship with son, not as fast as she would like.  Client tends to be demonstrative in relationships and son seems to pull away.       Treatment Objective(s) Addressed in This Session:   Client will track and record at least daily pleasant exchanges with family members.     Intervention:   Validation, hearing others' perspectives.  Love languages.        ASSESSMENT: Current Emotional / Mental Status (status of significant symptoms):   Risk status (Self / Other harm or suicidal ideation)  Client denies current fears or concerns for personal safety.   Client denies current or recent suicidal ideation or behaviors.   Client denies current or recent homicidal ideation or behaviors.   Client denies current or recent self injurious behavior or ideation.   Client denies other safety concerns.   A safety and risk management plan has not been developed at this time, however client was given the after-hours number / 911 should there be a change in any of these risk factors.     Appearance:   Appropriate    Eye Contact:   Fair    Psychomotor Behavior: Restless    Attitude:   Cooperative    Orientation:   All   Speech    Rate / Production: Normal     Volume:  Normal    Mood:    Anxious  "   Affect:    Appropriate  Bright    Thought Content:  Clear    Thought Form:  Coherent  Logical    Insight:    Fair        Medication Review:   No changes to current psychiatric medication(s)     Medication Compliance:   Yes     Changes in Health Issues:   None reported     Chemical Use Review:   Substance Use: Chemical use reviewed, no active concerns identified      Tobacco Use: No current tobacco use.       Collateral Reports Completed:   Not Applicable    PLAN: (Client Tasks / Therapist Tasks / Other)  1.  \"Be still\" with son in her responses.    2.   Client and daughter will attend next session.    KAYLYNN Baptiste                                                    ________________________________________________________________________    Treatment Plan    Client's Name: Elvia Howe  YOB: 1965    Date: 11/25/16    Diagnoses: Adjustment Disorders  309.28 (F43.23) With mixed anxiety and depressed mood  Psychosocial & Contextual Factors: family strain  WHODAS: 14    Referral / Collaboration:  Referral to another professional/service is not indicated at this time..    Anticipated number of session or this episode of care: 6-12      MeasurableTreatment Goal(s) related to diagnosis / functional impairment(s)  Goal 1: Client will report reduction in anxiety also as evidenced by reduction of HEATH-7 score below 6 points within the next 12 weeks.    Objective #A (Client Action)    Client will identify at least three triggers for anxiety.  Status: Completed - Date: 2/28/17      Intervention(s)  Therapist will provide educational materials on common triggers and signs of anxiety.    Objective #B  Client will use relaxation strategies at least two times per day to reduce the physical symptoms of anxiety.  Status: Continued - Date(s): 2/28/17      Intervention(s)  Therapist will teach relaxation strategies such as mindfulness, deep breathing, muscle relaxation, and sensory " activities.    Objective #C  Client will use cognitive strategies identified in therapy to challenge anxious thoughts.  Status: Continued - Date(s): 2/28/17      Intervention(s)  Therapist will teach strategies for cognitive modification using REBT model.      Goal 2: Client will eliminate ineffective communication and verbal aggression with family members.    Objective #A (Client Action)    Client will practice the use of timeouts.  Status: Continued: 2/28/17     Intervention(s)  Therapist will teach rules for taking a timeout. Practice/role-play in session.  Assign homework for practice.    Objective #B  Client will learn & utilize at least five assertive communication skills weekly.    Status: Continued: 2/28/17     Intervention(s)  Therapist will teach assertiveness skills. Role-play in session.    Objective #C  Client will track and record at least daily pleasant exchanges with family members.  Status: Continued - Date(s): 2/28/17     Intervention(s)  Therapist will assign homework for practice at home.      Client have reviewed and agreed to the above plan.        Marilee Isidro, LMFT  November 25, 2016

## 2017-03-01 ASSESSMENT — ANXIETY QUESTIONNAIRES: GAD7 TOTAL SCORE: 2

## 2017-03-01 ASSESSMENT — PATIENT HEALTH QUESTIONNAIRE - PHQ9: SUM OF ALL RESPONSES TO PHQ QUESTIONS 1-9: 1

## 2017-03-07 DIAGNOSIS — F41.9 ANXIETY: ICD-10-CM

## 2017-03-07 DIAGNOSIS — F33.1 MAJOR DEPRESSIVE DISORDER, RECURRENT EPISODE, MODERATE (H): ICD-10-CM

## 2017-03-09 RX ORDER — BUPROPION HYDROCHLORIDE 150 MG/1
150 TABLET ORAL EVERY MORNING
Qty: 90 TABLET | Refills: 0 | Status: SHIPPED | OUTPATIENT
Start: 2017-03-09 | End: 2018-01-12

## 2017-03-10 DIAGNOSIS — F41.9 ANXIETY: ICD-10-CM

## 2017-03-10 DIAGNOSIS — F33.1 MAJOR DEPRESSIVE DISORDER, RECURRENT EPISODE, MODERATE (H): ICD-10-CM

## 2017-03-10 RX ORDER — CITALOPRAM HYDROBROMIDE 40 MG/1
40 TABLET ORAL DAILY
Qty: 90 TABLET | Refills: 0 | Status: SHIPPED | OUTPATIENT
Start: 2017-03-10 | End: 2018-01-12

## 2017-05-12 ENCOUNTER — TELEPHONE (OUTPATIENT)
Dept: PSYCHOLOGY | Facility: CLINIC | Age: 52
End: 2017-05-12

## 2017-05-12 NOTE — TELEPHONE ENCOUNTER
Left voicemail informing of therapist's resignation.  Requested call back if she would like a referral for services or a transfer.  Informed that therapist will close her chart in 1 week if therapist does not hear from client.

## 2017-05-24 ENCOUNTER — FCC EXTENDED DOCUMENTATION (OUTPATIENT)
Dept: PSYCHOLOGY | Facility: CLINIC | Age: 52
End: 2017-05-24

## 2017-05-24 NOTE — PROGRESS NOTES
"                      Discharge Summary  Multiple Sessions    Client Name: Elvia Howe MRN#: [id number] YOB: 1965      Intake / Discharge Date: Intake: 11/1/16  / Discharge: 5/24/17      DSM5 Diagnoses: (Sustained by DSM5 Criteria Listed Above)  Diagnoses: Adjustment Disorders  309.28 (F43.23) With mixed anxiety and depressed mood  Psychosocial & Contextual Factors: family strain  WHODAS: 14          Presenting Concern:  At time of intake, client presented with the following concerns: \"unhappy home life.\"  Client reports she, , and their children are struggling with respect and communication.  Family members identify client as the \"enforcer\" and there is disconnection in ability to talk about issues without emotional escalation.Client expresses sadness about the state of relationships and desires more closeness with family, particularly with son.      Reason for Discharge:  Client did not return      Disposition at Time of Last Encounter:   Comments:   Client attended session.  Son did not show up as planned.  She notices some small movements in relationship with son, not as fast as she would like.  Client tends to be demonstrative in relationships and son seems to pull away.       Risk Management:   Client denies a history of suicidal ideation, suicide attempts, self-injurious behavior, homicidal ideation, homicidal behavior and and other safety concerns  A safety and risk management plan has not been developed at this time, however client was given the after-hours number / 911 should there be a change in any of these risk factors.      Referred To:  Client is welcome to return to [facility] for therapy in the future and can contact [facility] Intake to schedule ([phone]).          KAYLYNN Baptiste   5/24/2017  "

## 2017-05-27 DIAGNOSIS — J45.40 MODERATE PERSISTENT ASTHMA, UNCOMPLICATED: ICD-10-CM

## 2017-07-07 ENCOUNTER — TELEPHONE (OUTPATIENT)
Dept: FAMILY MEDICINE | Facility: CLINIC | Age: 52
End: 2017-07-07

## 2017-09-24 DIAGNOSIS — J45.40 MODERATE PERSISTENT ASTHMA, UNCOMPLICATED: ICD-10-CM

## 2017-09-27 ENCOUNTER — TRANSFERRED RECORDS (OUTPATIENT)
Dept: HEALTH INFORMATION MANAGEMENT | Facility: CLINIC | Age: 52
End: 2017-09-27

## 2017-10-14 ENCOUNTER — RADIANT APPOINTMENT (OUTPATIENT)
Dept: MAMMOGRAPHY | Facility: CLINIC | Age: 52
End: 2017-10-14
Payer: COMMERCIAL

## 2017-10-14 DIAGNOSIS — Z12.31 VISIT FOR SCREENING MAMMOGRAM: ICD-10-CM

## 2017-10-14 PROCEDURE — G0202 SCR MAMMO BI INCL CAD: HCPCS | Mod: TC

## 2017-10-25 ENCOUNTER — TRANSFERRED RECORDS (OUTPATIENT)
Dept: HEALTH INFORMATION MANAGEMENT | Facility: CLINIC | Age: 52
End: 2017-10-25

## 2017-12-04 DIAGNOSIS — F33.1 MAJOR DEPRESSIVE DISORDER, RECURRENT EPISODE, MODERATE (H): ICD-10-CM

## 2017-12-04 DIAGNOSIS — F41.9 ANXIETY: ICD-10-CM

## 2017-12-04 NOTE — LETTER
December 6, 2017    Elvia Howe  [address] [address]    Dear Elvia,       We recently received a refill request for citalopram and bupropion.  We have refilled this for a one time 30 day supply only because you are due for a:    Anxiety office visit         Please call at your earliest convenience so that there will not be a delay with your future refills.          Thank you,   Your [facility] Team/  [phone]

## 2017-12-06 RX ORDER — CITALOPRAM HYDROBROMIDE 40 MG/1
TABLET ORAL
Qty: 30 TABLET | Refills: 0 | Status: SHIPPED | OUTPATIENT
Start: 2017-12-06 | End: 2018-01-12

## 2017-12-06 RX ORDER — BUPROPION HYDROCHLORIDE 150 MG/1
TABLET ORAL
Qty: 30 TABLET | Refills: 0 | Status: SHIPPED | OUTPATIENT
Start: 2017-12-06 | End: 2018-01-12

## 2017-12-06 NOTE — TELEPHONE ENCOUNTER
Medication is being filled for 1 time refill only due to:  Patient needs to be seen because due for fasting laba appt and ov .   Christine Toro RN

## 2017-12-20 ENCOUNTER — SURGERY (OUTPATIENT)
Age: 52
End: 2017-12-20

## 2017-12-20 ENCOUNTER — HOSPITAL ENCOUNTER (OUTPATIENT)
Facility: AMBULATORY SURGERY CENTER | Age: 52
Discharge: HOME | End: 2017-12-20
Attending: SURGERY | Admitting: SURGERY
Payer: COMMERCIAL

## 2017-12-20 VITALS
RESPIRATION RATE: 16 BRPM | OXYGEN SATURATION: 95 % | SYSTOLIC BLOOD PRESSURE: 128 MMHG | DIASTOLIC BLOOD PRESSURE: 68 MMHG | TEMPERATURE: 97.2 F

## 2017-12-20 LAB — COLONOSCOPY: NORMAL

## 2017-12-20 PROCEDURE — 45380 COLONOSCOPY AND BIOPSY: CPT | Mod: 59 | Performed by: SURGERY

## 2017-12-20 PROCEDURE — 45385 COLONOSCOPY W/LESION REMOVAL: CPT | Mod: PT | Performed by: SURGERY

## 2017-12-20 PROCEDURE — 99153 MOD SED SAME PHYS/QHP EA: CPT | Performed by: SURGERY

## 2017-12-20 PROCEDURE — 45385 COLONOSCOPY W/LESION REMOVAL: CPT

## 2017-12-20 PROCEDURE — G8918 PT W/O PREOP ORDER IV AB PRO: HCPCS

## 2017-12-20 PROCEDURE — 88305 TISSUE EXAM BY PATHOLOGIST: CPT | Performed by: SURGERY

## 2017-12-20 PROCEDURE — 99152 MOD SED SAME PHYS/QHP 5/>YRS: CPT | Performed by: SURGERY

## 2017-12-20 PROCEDURE — G8907 PT DOC NO EVENTS ON DISCHARG: HCPCS

## 2017-12-20 PROCEDURE — 45380 COLONOSCOPY AND BIOPSY: CPT | Mod: XS

## 2017-12-20 RX ORDER — FENTANYL CITRATE 50 UG/ML
INJECTION, SOLUTION INTRAMUSCULAR; INTRAVENOUS PRN
Status: DISCONTINUED | OUTPATIENT
Start: 2017-12-20 | End: 2017-12-20 | Stop reason: HOSPADM

## 2017-12-20 RX ORDER — ONDANSETRON 2 MG/ML
4 INJECTION INTRAMUSCULAR; INTRAVENOUS
Status: DISCONTINUED | OUTPATIENT
Start: 2017-12-20 | End: 2017-12-21 | Stop reason: HOSPADM

## 2017-12-20 RX ORDER — LIDOCAINE 40 MG/G
CREAM TOPICAL
Status: DISCONTINUED | OUTPATIENT
Start: 2017-12-20 | End: 2017-12-21 | Stop reason: HOSPADM

## 2017-12-20 RX ADMIN — FENTANYL CITRATE 50 MCG: 50 INJECTION, SOLUTION INTRAMUSCULAR; INTRAVENOUS at 10:23

## 2017-12-20 RX ADMIN — FENTANYL CITRATE 50 MCG: 50 INJECTION, SOLUTION INTRAMUSCULAR; INTRAVENOUS at 10:48

## 2017-12-20 RX ADMIN — FENTANYL CITRATE 100 MCG: 50 INJECTION, SOLUTION INTRAMUSCULAR; INTRAVENOUS at 10:19

## 2017-12-22 LAB — COPATH REPORT: NORMAL

## 2018-01-12 ENCOUNTER — OFFICE VISIT (OUTPATIENT)
Dept: FAMILY MEDICINE | Facility: CLINIC | Age: 53
End: 2018-01-12
Payer: COMMERCIAL

## 2018-01-12 VITALS
OXYGEN SATURATION: 96 % | SYSTOLIC BLOOD PRESSURE: 139 MMHG | DIASTOLIC BLOOD PRESSURE: 88 MMHG | HEIGHT: 64 IN | WEIGHT: 247 LBS | BODY MASS INDEX: 42.17 KG/M2 | TEMPERATURE: 98.7 F | HEART RATE: 80 BPM

## 2018-01-12 DIAGNOSIS — F33.1 MAJOR DEPRESSIVE DISORDER, RECURRENT EPISODE, MODERATE (H): Primary | ICD-10-CM

## 2018-01-12 DIAGNOSIS — Z23 NEED FOR VACCINATION: ICD-10-CM

## 2018-01-12 DIAGNOSIS — J45.40 MODERATE PERSISTENT ASTHMA, UNCOMPLICATED: ICD-10-CM

## 2018-01-12 PROCEDURE — 90471 IMMUNIZATION ADMIN: CPT | Performed by: FAMILY MEDICINE

## 2018-01-12 PROCEDURE — 90746 HEPB VACCINE 3 DOSE ADULT IM: CPT | Performed by: FAMILY MEDICINE

## 2018-01-12 PROCEDURE — 99214 OFFICE O/P EST MOD 30 MIN: CPT | Mod: 25 | Performed by: FAMILY MEDICINE

## 2018-01-12 RX ORDER — ALBUTEROL SULFATE 90 UG/1
2 AEROSOL, METERED RESPIRATORY (INHALATION) EVERY 4 HOURS PRN
Qty: 6 INHALER | Refills: 0 | Status: SHIPPED | OUTPATIENT
Start: 2018-01-12

## 2018-01-12 RX ORDER — BUPROPION HYDROCHLORIDE 150 MG/1
150 TABLET ORAL EVERY MORNING
Qty: 90 TABLET | Refills: 1 | Status: SHIPPED | OUTPATIENT
Start: 2018-01-12 | End: 2018-08-27

## 2018-01-12 RX ORDER — CITALOPRAM HYDROBROMIDE 40 MG/1
40 TABLET ORAL DAILY
Qty: 90 TABLET | Refills: 1 | Status: SHIPPED | OUTPATIENT
Start: 2018-01-12 | End: 2018-08-27

## 2018-01-12 RX ORDER — DEXTROAMPHETAMINE SACCHARATE, AMPHETAMINE ASPARTATE, DEXTROAMPHETAMINE SULFATE AND AMPHETAMINE SULFATE 2.5; 2.5; 2.5; 2.5 MG/1; MG/1; MG/1; MG/1
TABLET ORAL
COMMUNITY
Start: 2017-12-25

## 2018-01-12 ASSESSMENT — PATIENT HEALTH QUESTIONNAIRE - PHQ9
5. POOR APPETITE OR OVEREATING: NOT AT ALL
SUM OF ALL RESPONSES TO PHQ QUESTIONS 1-9: 1

## 2018-01-12 ASSESSMENT — ANXIETY QUESTIONNAIRES
7. FEELING AFRAID AS IF SOMETHING AWFUL MIGHT HAPPEN: NOT AT ALL
GAD7 TOTAL SCORE: 3
3. WORRYING TOO MUCH ABOUT DIFFERENT THINGS: SEVERAL DAYS
2. NOT BEING ABLE TO STOP OR CONTROL WORRYING: SEVERAL DAYS
IF YOU CHECKED OFF ANY PROBLEMS ON THIS QUESTIONNAIRE, HOW DIFFICULT HAVE THESE PROBLEMS MADE IT FOR YOU TO DO YOUR WORK, TAKE CARE OF THINGS AT HOME, OR GET ALONG WITH OTHER PEOPLE: NOT DIFFICULT AT ALL
6. BECOMING EASILY ANNOYED OR IRRITABLE: SEVERAL DAYS
5. BEING SO RESTLESS THAT IT IS HARD TO SIT STILL: NOT AT ALL
1. FEELING NERVOUS, ANXIOUS, OR ON EDGE: NOT AT ALL

## 2018-01-12 NOTE — MR AVS SNAPSHOT
"              After Visit Summary   2018    Elvia Howe    MRN: [id number]           Patient Information     Date Of Birth          1965        Visit Information        Provider Department      2018 10:30 AM Josiah Salamanca MD [facility]        Today's Diagnoses     Moderate persistent asthma, uncomplicated          Care Instructions    Let me see you back in 6 months for a physical, fasting.          Follow-ups after your visit        Who to contact     If you have questions or need follow up information about today's clinic visit or your schedule please contact [facility] directly at [phone].  Normal or non-critical lab and imaging results will be communicated to you by MyChart, letter or phone within 4 business days after the clinic has received the results. If you do not hear from us within 7 days, please contact the clinic through [email or web address] or phone. If you have a critical or abnormal lab result, we will notify you by phone as soon as possible.  Submit refill requests through [employer] or call your pharmacy and they will forward the refill request to us. Please allow 3 business days for your refill to be completed.          Additional Information About Your Visit        MyChart Information     [employer] lets you send messages to your doctor, view your test results, renew your prescriptions, schedule appointments and more. To sign up, go to [email or web address] . Click on \"Log in\" on the left side of the screen, which will take you to the Welcome page. Then click on \"Sign up Now\" on the right side of the page.     You will be asked to enter the access code listed below, as well as some personal information. Please follow the directions to create your username and password.     Your access code is: [email or web address]  Expires: 2018 11:07 AM     Your access code will  in 90 days. If you need help or a new code, please call your [facility] or " "[phone].        Care EveryWhere ID     This is your Care EveryWhere ID. This could be used by other organizations to access your [facility] medical records  [id number]        Your Vitals Were     Pulse Temperature Height Pulse Oximetry BMI (Body Mass Index)       80 98.7  F (37.1  C) (Oral) 5' 4\" (1.626 m) 96% 42.4 kg/m2        Blood Pressure from Last 3 Encounters:   01/12/18 139/88   12/20/17 128/68   01/06/17 136/70    Weight from Last 3 Encounters:   01/12/18 247 lb (112 kg)   03/09/16 230 lb (104.3 kg)   09/24/15 242 lb (109.8 kg)              Today, you had the following     No orders found for display         Today's Medication Changes          These changes are accurate as of: 1/12/18 11:08 AM.  If you have any questions, ask your nurse or doctor.               These medicines have changed or have updated prescriptions.        Dose/Directions    * ADVAIR DISKUS 250-50 MCG/DOSE diskus inhaler   This may have changed:  Another medication with the same name was added. Make sure you understand how and when to take each.   Used for:  Moderate persistent asthma, uncomplicated   Generic drug:  fluticasone-salmeterol   Changed by:  Josiah Salamanca MD        INHALE 1 PUFF INTO THE LUNGS EVERY 12 HOURS   Quantity:  1 Inhaler   Refills:  3       * fluticasone-salmeterol 250-50 MCG/DOSE diskus inhaler   Commonly known as:  ADVAIR DISKUS   This may have changed:  You were already taking a medication with the same name, and this prescription was added. Make sure you understand how and when to take each.   Used for:  Moderate persistent asthma, uncomplicated   Changed by:  Josiah Salamanca MD        Dose:  1 puff   Inhale 1 puff into the lungs every 12 hours   Quantity:  1 Inhaler   Refills:  3       * Notice:  This list has 2 medication(s) that are the same as other medications prescribed for you. Read the directions carefully, and ask your doctor or other care provider to review them with you.         Where to get " your medicines      These medications were sent to [id number] - [facility] [address] [facility]  [address], [facility] [address]     Phone:  [phone]     fluticasone-salmeterol 250-50 MCG/DOSE diskus inhaler         Some of these will need a paper prescription and others can be bought over the counter.  Ask your nurse if you have questions.     Bring a paper prescription for each of these medications     albuterol 108 (90 BASE) MCG/ACT Inhaler                Primary Care Provider Office Phone # Fax #    Josiah Salamanca [phone] [address] [address] [facility] [address]        Equal Access to Services     LAM BARRAGAN : Hadii arvin padillao Sosy, waaxda luqadaha, qaybta kaalmada adeegyada, josé antonio dickson . So [facility] [phone].    ATENCIÓN: Si habla español, tiene a gill disposición servicios gratuitos de asistencia lingüística. [facility] [phone].    We comply with applicable federal civil rights laws and [place] laws. We do not discriminate on the basis of race, color, national origin, age, disability, sex, sexual orientation, or gender identity.            Thank you!     Thank you for choosing [facility]  for your care. Our goal is always to provide you with excellent care. Hearing back from our patients is one way we can continue to improve our services. Please take a few minutes to complete the written survey that you may receive in the mail after your visit with us. Thank you!             Your Updated Medication List - Protect others around you: Learn how to safely use, store and throw away your medicines at www.disposemymeds.org.          This list is accurate as of: 1/12/18 11:08 AM.  Always use your most recent med list.                   Brand Name Dispense Instructions for use Diagnosis    * ADVAIR DISKUS 250-50 MCG/DOSE diskus inhaler   Generic drug:   fluticasone-salmeterol     1 Inhaler    INHALE 1 PUFF INTO THE LUNGS EVERY 12 HOURS    Moderate persistent asthma, uncomplicated       * fluticasone-salmeterol 250-50 MCG/DOSE diskus inhaler    ADVAIR DISKUS    1 Inhaler    Inhale 1 puff into the lungs every 12 hours    Moderate persistent asthma, uncomplicated       albuterol 108 (90 BASE) MCG/ACT Inhaler    PROAIR HFA/PROVENTIL HFA/VENTOLIN HFA    6 Inhaler    Inhale 2 puffs into the lungs every 4 hours as needed for shortness of breath / dyspnea or wheezing    Moderate persistent asthma, uncomplicated       ALBUTEROL IN           amphetamine-dextroamphetamine 10 MG per tablet    ADDERALL     1-2 three times a day as directed.        buPROPion 150 MG 24 hr tablet    WELLBUTRIN XL    30 tablet    TAKE 1 TABLET(150 MG) BY MOUTH EVERY MORNING    Major depressive disorder, recurrent episode, moderate (H), Anxiety       citalopram 40 MG tablet    celeXA    30 tablet    TAKE 1 TABLET(40 MG) BY MOUTH DAILY    Anxiety, Major depressive disorder, recurrent episode, moderate (H)       loratadine 10 MG capsule     90 capsule    Take 10 mg by mouth daily    Environmental allergies       order for DME     1 each    Equipment being ordered: cpap tubing, mask, filters, and head gear for lifetime.    Sleep apnea, unspecified type       * Notice:  This list has 2 medication(s) that are the same as other medications prescribed for you. Read the directions carefully, and ask your doctor or other care provider to review them with you.

## 2018-01-12 NOTE — NURSING NOTE
"Chief Complaint   Patient presents with     Depression     Anxiety     Asthma       Initial /88 (Cuff Size: Adult Large)  Pulse 80  Temp 98.7  F (37.1  C) (Oral)  Ht 5' 4\" (1.626 m)  Wt 247 lb (112 kg)  SpO2 96%  BMI 42.4 kg/m2 Estimated body mass index is 42.4 kg/(m^2) as calculated from the following:    Height as of this encounter: 5' 4\" (1.626 m).    Weight as of this encounter: 247 lb (112 kg).  Medication Reconciliation: complete    Mercy Santana LPN    "

## 2018-01-12 NOTE — PROGRESS NOTES
HPI:    Elvia is a 52 year old female here to discuss:    Hypersomnia/DOLORES - she has sleepiness during the day and has a difficult time staying awake. She has been dx'd with Narcolepsy/cataplexy.  Evaluation and treatment:    She follows with Dr. Son Guy[facility].   She uses CPAP   Modafinil helped initially but not later.   Adderall helps - she wants me to prescribe if ok with Dr. Guy which is fine.    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    Obesity -   Evaluation and treatment:    diet and exercise discussed. Declines referral to nutrition.    Phentermine previously every 2 days was working well. If she takes it daily then she can't think clearly.    I told her we couldn't prescribe Phentermine with Adderall or other stimulants.   We can try Topamax and discussed side effects. She said she will think about it and let me know.  Wt Readings from Last 5 Encounters:   01/12/18 247 lb (112 kg)   03/09/16 230 lb (104.3 kg)   09/24/15 242 lb (109.8 kg)   08/03/15 260 lb (117.9 kg)   08/28/13 255 lb (115.7 kg)     07/29/13  258 lb (117.028 kg)      01/24/13   276 lb (125.193 kg)       Asthma, moderate persistent - Duration: since around 2002. Symptoms: Cough, shortness of breath, wheezing. H/O hospitalization: no. H/O steroid treatment: yes. Smoker: no. Triggers are environmental allergies.  Evaluation and treatment:     uses abl rarely - gets from [facility].   Advair 250/50   Counseling: previously done today about the detriments of uncontrolled asthma, proper inhaler technique.    ACT Total Scores 1/12/2018   ACT TOTAL SCORE -   ASTHMA ER VISITS -   ASTHMA HOSPITALIZATIONS -   ACT TOTAL SCORE (Goal Greater than or Equal to 20) 21   In the past 12 months, how many times did you visit the emergency room for your asthma without being admitted to the hospital? 0   In the past 12 months, how many times were you hospitalized overnight because of your asthma? 0       Environmental  Allergies - her symptoms are year round and include itchy, runny eyes, nose, sneezing. Loratadine helps. Zyrtec and Singulair no help. I had referred her to allergy but she did not go.     Anxiety/depression - symptoms are low mood, worry easily overwhelmed, low energy. No SI or HI.  Evaluation and treatment:    Celexa 40 mg qd and Wellbutrin 150 mg qd. No side effects.   No longer on Buspar - not needed.   Continue same tx.    PHQ-9 SCORE 1/6/2017 2/28/2017 1/12/2018   Total Score - - -   Total Score 0 1 1     HEATH-7 SCORE 1/6/2017 2/28/2017 1/12/2018   Total Score - - -   Total Score 2 2 3       Neck pain - previously muscle relaxer helped. No issues now.     Knee pain - had steroid shots per ortho.      Vit D 2000 units per day but has not been taking it. I encouraged her to do that.     Diarrhea - She tells me her symptoms have resolved since using probiotics. Not needing any more.    Previous h/o sea sickness - Scop patch prn helps.      Preventive - we gave her final Hep B today. Declines Prevnar.     Immunization History   Administered Date(s) Administered     HepB 06/13/2011, 11/08/2011     HepB-Adult 01/12/2018     Influenza (IIV3) PF 10/08/2012, 11/14/2017     Influenza Vaccine IM 3yrs+ 4 Valent IIV4 12/11/2014, 11/04/2015, 11/09/2016     Pneumococcal 23 valent 11/08/2011, 10/08/2012     TDAP Vaccine (Adacel) 06/13/2011     Lipids screen:    Recent Labs   Lab Test  09/24/15   1117  08/28/13   1119   CHOL  172  169   HDL  51  50   LDL  102  105   TRIG  95  69   CHOLHDLRATIO  3.4  3.4       PAP - has had hysterectomy, still has cervix - declines PAP.    Mammogram: 10/14/17 negative    Colon cancer screen: colonoscopy 12/20/17 - repeat in 3 years    ROS:    Const: No fevers, weight changes or night sweats recently.  ENT: No runny nose, sore throat or ear pain.  Resp: No cough or shortness of breath.  CV: No chest pain, dizziness or cardiac palpitations.  GI: No nausea, vomiting, diarrhea or constipation.  "Denies blood in stools or black stools.  : No dysuria, frequency or hematuria.  The rest of the ROS negative, other than listed on HPI        SH:    Marital status:   Kids: 2  Employment: life insurance exams  Exercise: swimming 4 days per week for 30 minutes per time.  Tobacco: no  Etoh: 1 every 2 months  Recreational drugs: denies  Caffeine: 3 pops per day.    Exam:    /88 (Cuff Size: Adult Large)  Pulse 80  Temp 98.7  F (37.1  C) (Oral)  Ht 5' 4\" (1.626 m)  Wt 247 lb (112 kg)  SpO2 96%  BMI 42.4 kg/m2    Gen: Healthy appearing female in no acute distress  Eyes: Conjunctiva and sclera normal. Pupils react normally to light. No nystagmus.  Neck: No enlarged lymph nodes, thyromegally or other masses.  Lungs: Good air movement and otherwise clear.  CV: Heart RRR with no murmurs. No JVD, carotid bruits or leg edema.  Psych: Affect is normal. Speech is fluent. Thought logical. Insight and judgement seem to be intact. Denies SI or HI.      Assessment and Plan - Decision Making    1. Moderate persistent asthma, uncomplicated  Per HPI  - fluticasone-salmeterol (ADVAIR DISKUS) 250-50 MCG/DOSE diskus inhaler; Inhale 1 puff into the lungs every 12 hours  Dispense: 1 Inhaler; Refill: 3  - albuterol (PROAIR HFA/PROVENTIL HFA/VENTOLIN HFA) 108 (90 BASE) MCG/ACT Inhaler; Inhale 2 puffs into the lungs every 4 hours as needed for shortness of breath / dyspnea or wheezing  Dispense: 6 Inhaler; Refill: 0    2. Major depressive disorder, recurrent episode, moderate (H)  Per HPI  - citalopram (CELEXA) 40 MG tablet; Take 1 tablet (40 mg) by mouth daily  Dispense: 90 tablet; Refill: 1  - buPROPion (WELLBUTRIN XL) 150 MG 24 hr tablet; Take 1 tablet (150 mg) by mouth every morning  Dispense: 90 tablet; Refill: 1    3. Need for vaccination    - HEPATITIS B VACCINE,  ADULT  [32991]  - 1st  Administration  [[id number]]      Written instructions given as follows:    Patient Instructions   Let me see you back in 6 months for a " physical, fasting.           Signed Electronically by: TALIA NAVARRO MD    Copy of this evaluation report is provided to requesting physician.    [facility] Guidelines    Screening Questionnaire for Adult Immunization    Are you sick today?   No   Do you have allergies to medications, food, a vaccine component or latex?   No   Have you ever had a serious reaction after receiving a vaccination?   No   Do you have a long-term health problem with heart disease, lung disease, asthma, kidney disease, metabolic disease (e.g. diabetes), anemia, or other blood disorder?   Yes   Do you have cancer, leukemia, HIV/AIDS, or any other immune system problem?   No   In the past 3 months, have you taken medications that affect  your immune system, such as prednisone, other steroids, or anticancer drugs; drugs for the treatment of rheumatoid arthritis, Crohn s disease, or psoriasis; or have you had radiation treatments?   No   Have you had a seizure, or a brain or other nervous system problem?   No   During the past year, have you received a transfusion of blood or blood     products, or been given immune (gamma) globulin or antiviral drug?   No   For women: Are you pregnant or is there a chance you could become        pregnant during the next month?   No   Have you received any vaccinations in the past 4 weeks?   No     Immunization questionnaire was positive for at least one answer.  Notified Dr. Navarro.    Screening performed by Mercy Santana on 1/12/2018 at 11:21 AM.

## 2018-01-13 ASSESSMENT — ASTHMA QUESTIONNAIRES: ACT_TOTALSCORE: 21

## 2018-01-13 ASSESSMENT — ANXIETY QUESTIONNAIRES: GAD7 TOTAL SCORE: 3

## 2018-01-25 ENCOUNTER — TRANSFERRED RECORDS (OUTPATIENT)
Dept: HEALTH INFORMATION MANAGEMENT | Facility: CLINIC | Age: 53
End: 2018-01-25

## 2018-03-20 ENCOUNTER — TELEPHONE (OUTPATIENT)
Dept: FAMILY MEDICINE | Facility: CLINIC | Age: 53
End: 2018-03-20

## 2018-03-20 NOTE — TELEPHONE ENCOUNTER
Patient actually calling about concerns about her brother. Disregard message.  .June EDDYN, RN, CPN

## 2018-08-27 DIAGNOSIS — F33.1 MAJOR DEPRESSIVE DISORDER, RECURRENT EPISODE, MODERATE (H): ICD-10-CM

## 2018-08-27 NOTE — LETTER
August 29, 2018    Elvia Howe  [address] [address] [address]    Dear Elvia,       We recently received a refill request for citalopram and bupropion.  We have refilled this for a one time 30 day supply only because you are due for a:    Depression office visit      Please call at your earliest convenience so that there will not be a delay with your future refills.          Thank you,   Your [facility] [place] Team/  [phone]

## 2018-08-29 RX ORDER — BUPROPION HYDROCHLORIDE 150 MG/1
TABLET ORAL
Qty: 30 TABLET | Refills: 0 | Status: SHIPPED | OUTPATIENT
Start: 2018-08-29 | End: 2018-09-13

## 2018-08-29 RX ORDER — CITALOPRAM HYDROBROMIDE 40 MG/1
TABLET ORAL
Qty: 30 TABLET | Refills: 0 | Status: SHIPPED | OUTPATIENT
Start: 2018-08-29 | End: 2018-09-13

## 2018-08-29 NOTE — TELEPHONE ENCOUNTER
Medication is being filled for 1 time refill only due to:  Patient needs to be seen for fasting lab appointment and appointment with the provider for further refills.  Physical and depression.  Christine EDDYN, RN

## 2018-09-13 ENCOUNTER — OFFICE VISIT (OUTPATIENT)
Dept: FAMILY MEDICINE | Facility: CLINIC | Age: 53
End: 2018-09-13
Payer: COMMERCIAL

## 2018-09-13 VITALS
DIASTOLIC BLOOD PRESSURE: 86 MMHG | TEMPERATURE: 99.1 F | WEIGHT: 238 LBS | OXYGEN SATURATION: 97 % | SYSTOLIC BLOOD PRESSURE: 126 MMHG | HEART RATE: 81 BPM | BODY MASS INDEX: 40.85 KG/M2

## 2018-09-13 DIAGNOSIS — E66.01 MORBID OBESITY (H): ICD-10-CM

## 2018-09-13 DIAGNOSIS — F33.1 MAJOR DEPRESSIVE DISORDER, RECURRENT EPISODE, MODERATE (H): ICD-10-CM

## 2018-09-13 PROCEDURE — 99214 OFFICE O/P EST MOD 30 MIN: CPT | Performed by: FAMILY MEDICINE

## 2018-09-13 RX ORDER — PHENTERMINE HYDROCHLORIDE 37.5 MG/1
37.5 CAPSULE ORAL EVERY MORNING
Qty: 30 CAPSULE | Refills: 0 | Status: SHIPPED | OUTPATIENT
Start: 2018-09-13 | End: 2020-08-21

## 2018-09-13 RX ORDER — CITALOPRAM HYDROBROMIDE 40 MG/1
40 TABLET ORAL DAILY
Qty: 90 TABLET | Refills: 3 | Status: SHIPPED | OUTPATIENT
Start: 2018-09-13 | End: 2019-11-02

## 2018-09-13 RX ORDER — BUPROPION HYDROCHLORIDE 150 MG/1
150 TABLET ORAL EVERY MORNING
Qty: 90 TABLET | Refills: 3 | Status: SHIPPED | OUTPATIENT
Start: 2018-09-13 | End: 2019-11-02

## 2018-09-13 NOTE — PROGRESS NOTES
HPI:    Elvia is a 53 year old female here to discuss:    Hypersomnia/DOLORES - she has sleepiness during the day and has a difficult time staying awake. She has been dx'd with Narcolepsy/cataplexy.  Evaluation and treatment:    She follows with Dr. Son Guy, [facility].   She uses CPAP   Modafinil helped initially but not later.   Adderall helps - prescribed by Dr. Guy.    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    Obesity -   Evaluation and treatment:    diet and exercise discussed. Declines referral to nutrition.    Phentermine previously every 2 days was working well. If she takes it daily then she can't think clearly.    I told her we couldn't prescribe Phentermine with Adderall or other stimulants - but he wants one month of Phentermine and she promises not to use with Adderall - I approved it.   We can try Topamax and discussed side effects. She said she will think about it and let me know.    Wt Readings from Last 5 Encounters:   09/13/18 238 lb (108 kg)   01/12/18 247 lb (112 kg)   03/09/16 230 lb (104.3 kg)   09/24/15 242 lb (109.8 kg)   08/03/15 260 lb (117.9 kg)     07/29/13  258 lb (117.028 kg)      01/24/13   276 lb (125.193 kg)       Asthma, moderate persistent - Duration: since around 2002. Symptoms: Cough, shortness of breath, wheezing. H/O hospitalization: no. H/O steroid treatment: yes. Smoker: no. Triggers are environmental allergies.  Evaluation and treatment:    uses abl rarely - gets from [facility].   Advair 250/50   Counseling: previously done today about the detriments of uncontrolled asthma, proper inhaler technique.    ACT Total Scores 1/12/2018   ACT TOTAL SCORE -   ASTHMA ER VISITS -   ASTHMA HOSPITALIZATIONS -   ACT TOTAL SCORE (Goal Greater than or Equal to 20) 21   In the past 12 months, how many times did you visit the emergency room for your asthma without being admitted to the hospital? 0   In the past 12 months, how many times were you hospitalized  overnight because of your asthma? 0       Environmental Allergies - her symptoms are year round and include itchy, runny eyes, nose, sneezing. Loratadine helps. Zyrtec and Singulair no help. I had referred her to allergy but she did not go.     Anxiety/depression - symptoms are low mood, worry easily overwhelmed, low energy. No SI or HI.  Evaluation and treatment:    Celexa 40 mg qd and Wellbutrin 150 mg qd. No side effects.   No longer on Buspar - not needed.   Continue same tx.    PHQ-9 SCORE 1/6/2017 2/28/2017 1/12/2018   Total Score - - -   Total Score 0 1 1     HEATH-7 SCORE 1/6/2017 2/28/2017 1/12/2018   Total Score - - -   Total Score 2 2 3       Neck pain - previously muscle relaxer helped. No issues now.     Knee pain - had steroid shots per ortho.      Vit D 2000 units per day but has not been taking it. I encouraged her to do that.     Diarrhea - She tells me her symptoms have resolved since using probiotics. Not needing any more.    Previous h/o sea sickness - Scop patch prn helps.      Preventive - Declines Prevnar. I asked her to get Flu shot and Shingles at our pharmacy which she did.    Immunization History   Administered Date(s) Administered     HepB 06/13/2011, 11/08/2011     HepB-Adult 01/12/2018     Influenza (IIV3) PF 10/08/2012, 11/14/2017     Influenza Quad, Recombinant, p-free (RIV4) 09/13/2018     Influenza Vaccine IM 3yrs+ 4 Valent IIV4 12/11/2014, 11/04/2015, 11/09/2016     Pneumococcal 23 valent 11/08/2011, 10/08/2012     TDAP Vaccine (Adacel) 06/13/2011     Zoster vaccine recombinant adjuvanted (SHINGRIX) 09/13/2018     Lipids screen:    Recent Labs   Lab Test  09/24/15   1117  08/28/13   1119   CHOL  172  169   HDL  51  50   LDL  102  105   TRIG  95  69   CHOLHDLRATIO  3.4  3.4       PAP - has had hysterectomy, still has cervix - declines PAP.    Mammogram: 10/14/17 negative.    Colon cancer screen: colonoscopy 12/20/17 - repeat in 3 years    ROS:    Const: No fevers, weight changes or  night sweats recently.  ENT: No runny nose, sore throat or ear pain.  Resp: No cough or shortness of breath.  CV: No chest pain, dizziness or cardiac palpitations.  GI: No nausea, vomiting, diarrhea or constipation. Denies blood in stools or black stools.  : No dysuria, frequency or hematuria.  The rest of the ROS negative, other than listed on HPI      SH:    Marital status:   Kids: 2  Employment: life insurance exams  Exercise: swimming 4 days per week for 30 minutes per time.  Tobacco: no  Etoh: 1 every 2 months  Recreational drugs: denies  Caffeine: 3 pops per day.    Exam:    /86 (Cuff Size: Adult Large)  Pulse 81  Temp 99.1  F (37.3  C) (Oral)  Wt 238 lb (108 kg)  SpO2 97%  Breastfeeding? No  BMI 40.85 kg/m2    Gen: Healthy appearing female in no acute distress  Eyes: Conjunctiva and sclera normal. Pupils react normally to light. No nystagmus.  Neck: No enlarged lymph nodes, thyromegally or other masses.  Lungs: Good air movement and otherwise clear.  CV: Heart RRR with no murmurs. No JVD, carotid bruits or leg edema.  Psych: Affect is normal. Speech is fluent. Thought logical. Insight and judgement seem to be intact. Denies SI or HI.      Assessment and Plan - Decision Making    1. Major depressive disorder, recurrent episode, moderate (H)    See HPI for details.    - citalopram (CELEXA) 40 MG tablet; Take 1 tablet (40 mg) by mouth daily  Dispense: 90 tablet; Refill: 3  - buPROPion (WELLBUTRIN XL) 150 MG 24 hr tablet; Take 1 tablet (150 mg) by mouth every morning  Dispense: 90 tablet; Refill: 3    2. Morbid obesity (H)    See HPI for details.    - phentermine 37.5 MG capsule; Take 1 capsule (37.5 mg) by mouth every morning  Dispense: 30 capsule; Refill: 0      Written instructions given as follows:    Patient Instructions   1. Continue your current medications.    2. Phentermine 30 days only - do not use on days you are taking Adderall.    3. Diet and exercise regularly.    4. Stop by our  pharmacy and get the flu shot and Shingles shot.    5. Let me see you in 6 months for a physical with fasting labs.

## 2018-09-13 NOTE — NURSING NOTE
"Chief Complaint   Patient presents with     Anxiety       Initial /86 (Cuff Size: Adult Large)  Pulse 81  Temp 99.1  F (37.3  C) (Oral)  Wt 238 lb (108 kg)  SpO2 97%  Breastfeeding? No  BMI 40.85 kg/m2 Estimated body mass index is 40.85 kg/(m^2) as calculated from the following:    Height as of 1/12/18: 5' 4\" (1.626 m).    Weight as of this encounter: 238 lb (108 kg).      Mercy Santana LPN    "

## 2018-09-13 NOTE — MR AVS SNAPSHOT
After Visit Summary   9/13/2018    Elvia Howe    MRN: [id number]           Patient Information     Date Of Birth          1965        Visit Information        Provider Department      9/13/2018 10:10 AM Josiah Salamanca MD [facility]        Today's Diagnoses     Major depressive disorder, recurrent episode, moderate (H)        Morbid obesity (H)          Care Instructions    1. Continue your current medications.    2. Phentermine 30 days only - do not use on days you are taking Adderall.    3. Diet and exercise regularly.    4. Stop by our pharmacy and get the flu shot and Shingles shot.    5. Let me see you in 6 months for a physical with fasting labs.          Follow-ups after your visit        Who to contact     If you have questions or need follow up information about today's clinic visit or your schedule please contact [facility] directly at [phone].  Normal or non-critical lab and imaging results will be communicated to you by MyChart, letter or phone within 4 business days after the clinic has received the results. If you do not hear from us within 7 days, please contact the clinic through [email or web address] or phone. If you have a critical or abnormal lab result, we will notify you by phone as soon as possible.  Submit refill requests through tinyclues or call your pharmacy and they will forward the refill request to us. Please allow 3 business days for your refill to be completed.          Additional Information About Your Visit        MyChart Information     tinyclues gives you secure access to your electronic health record. If you see a primary care provider, you can also send messages to your care team and make appointments. If you have questions, please call your primary care clinic.  If you do not have a primary care provider, please call [phone] and they will assist you.        Care EveryWhere ID     This is your Care EveryWhere ID. This could be used  by other organizations to access your [place] medical records  [id number]        Your Vitals Were     Pulse Temperature Pulse Oximetry Breastfeeding? BMI (Body Mass Index)       81 99.1  F (37.3  C) (Oral) 97% No 40.85 kg/m2        Blood Pressure from Last 3 Encounters:   09/13/18 126/86   01/12/18 139/88   12/20/17 128/68    Weight from Last 3 Encounters:   09/13/18 238 lb (108 kg)   01/12/18 247 lb (112 kg)   03/09/16 230 lb (104.3 kg)              Today, you had the following     No orders found for display         Today's Medication Changes          These changes are accurate as of 9/13/18 10:42 AM.  If you have any questions, ask your nurse or doctor.               Start taking these medicines.        Dose/Directions    phentermine 37.5 MG capsule   Used for:  Morbid obesity (H)   Started by:  Josiah Salamanca MD        Dose:  37.5 mg   Take 1 capsule (37.5 mg) by mouth every morning   Quantity:  30 capsule   Refills:  0         These medicines have changed or have updated prescriptions.        Dose/Directions    buPROPion 150 MG 24 hr tablet   Commonly known as:  WELLBUTRIN XL   This may have changed:  See the new instructions.   Used for:  Major depressive disorder, recurrent episode, moderate (H)   Changed by:  Josiah Salamanca MD        Dose:  150 mg   Take 1 tablet (150 mg) by mouth every morning   Quantity:  90 tablet   Refills:  3       citalopram 40 MG tablet   Commonly known as:  celeXA   This may have changed:  See the new instructions.   Used for:  Major depressive disorder, recurrent episode, moderate (H)   Changed by:  Josiah Salamanca MD        Dose:  40 mg   Take 1 tablet (40 mg) by mouth daily   Quantity:  90 tablet   Refills:  3            Where to get your medicines      These medications were sent to [facility] [id number] [place] [address] [address] [facility] [address]  [address] [facility] [address]     Phone:  [phone]     buPROPion 150 MG  24 hr tablet    citalopram 40 MG tablet         Some of these will need a paper prescription and others can be bought over the counter.  Ask your nurse if you have questions.     Bring a paper prescription for each of these medications     phentermine 37.5 MG capsule                Primary Care Provider Office Phone # Fax #    Josiah Salamanca [phone] [address] [facility] [address]        Equal Access to Services     LAM BARRAGAN : Hadii aad ku hadasho Soomaali, waaxda luqadaha, qaybta kaalmada adeegyada, waxay idiin hayaan adeeg kharash lamelo . So [facility] [phone].    ATENCIÓN: Si jenniferla espbharathi, tiene a gill disposición servicios gratuitos de asistencia lingüística. Llame al [phone].    We comply with applicable federal civil rights laws and [place] laws. We do not discriminate on the basis of race, color, national origin, age, disability, sex, sexual orientation, or gender identity.            Thank you!     Thank you for choosing [facility]  for your care. Our goal is always to provide you with excellent care. Hearing back from our patients is one way we can continue to improve our services. Please take a few minutes to complete the written survey that you may receive in the mail after your visit with us. Thank you!             Your Updated Medication List - Protect others around you: Learn how to safely use, store and throw away your medicines at www.disposemymeds.org.          This list is accurate as of 9/13/18 10:42 AM.  Always use your most recent med list.                   Brand Name Dispense Instructions for use Diagnosis    ADVAIR DISKUS 250-50 MCG/DOSE diskus inhaler   Generic drug:  fluticasone-salmeterol     1 Inhaler    INHALE 1 PUFF INTO THE LUNGS EVERY 12 HOURS    Moderate persistent asthma, uncomplicated       albuterol 108 (90 Base) MCG/ACT inhaler    PROAIR HFA/PROVENTIL HFA/VENTOLIN HFA    6 Inhaler    Inhale 2 puffs into the lungs every 4  hours as needed for shortness of breath / dyspnea or wheezing    Moderate persistent asthma, uncomplicated       ALBUTEROL IN           amphetamine-dextroamphetamine 10 MG per tablet    ADDERALL     1-2 three times a day as directed.        buPROPion 150 MG 24 hr tablet    WELLBUTRIN XL    90 tablet    Take 1 tablet (150 mg) by mouth every morning    Major depressive disorder, recurrent episode, moderate (H)       citalopram 40 MG tablet    celeXA    90 tablet    Take 1 tablet (40 mg) by mouth daily    Major depressive disorder, recurrent episode, moderate (H)       loratadine 10 MG capsule     90 capsule    Take 10 mg by mouth daily    Environmental allergies       order for DME     1 each    Equipment being ordered: cpap tubing, mask, filters, and head gear for lifetime.    Sleep apnea, unspecified type       phentermine 37.5 MG capsule     30 capsule    Take 1 capsule (37.5 mg) by mouth every morning    Morbid obesity (H)

## 2018-09-13 NOTE — PATIENT INSTRUCTIONS
1. Continue your current medications.    2. Phentermine 30 days only - do not use on days you are taking Adderall.    3. Diet and exercise regularly.    4. Stop by our pharmacy and get the flu shot and Shingles shot.    5. Let me see you in 6 months for a physical with fasting labs.

## 2018-09-14 ASSESSMENT — PATIENT HEALTH QUESTIONNAIRE - PHQ9: SUM OF ALL RESPONSES TO PHQ QUESTIONS 1-9: 0

## 2018-09-14 ASSESSMENT — ASTHMA QUESTIONNAIRES: ACT_TOTALSCORE: 21

## 2018-10-07 DIAGNOSIS — J45.40 MODERATE PERSISTENT ASTHMA, UNCOMPLICATED: ICD-10-CM

## 2018-12-18 ENCOUNTER — OFFICE VISIT (OUTPATIENT)
Dept: URGENT CARE | Facility: URGENT CARE | Age: 53
End: 2018-12-18
Payer: COMMERCIAL

## 2018-12-18 VITALS
HEART RATE: 90 BPM | BODY MASS INDEX: 42.05 KG/M2 | SYSTOLIC BLOOD PRESSURE: 156 MMHG | WEIGHT: 245 LBS | OXYGEN SATURATION: 96 % | DIASTOLIC BLOOD PRESSURE: 95 MMHG | TEMPERATURE: 98.1 F

## 2018-12-18 DIAGNOSIS — F43.23 ADJUSTMENT DISORDER WITH MIXED ANXIETY AND DEPRESSED MOOD: Primary | ICD-10-CM

## 2018-12-18 PROCEDURE — 99214 OFFICE O/P EST MOD 30 MIN: CPT | Performed by: PHYSICIAN ASSISTANT

## 2018-12-18 RX ORDER — HYDROXYZINE PAMOATE 25 MG/1
25 CAPSULE ORAL 4 TIMES DAILY PRN
Qty: 30 CAPSULE | Refills: 0 | Status: SHIPPED | OUTPATIENT
Start: 2018-12-18

## 2018-12-18 ASSESSMENT — ANXIETY QUESTIONNAIRES
3. WORRYING TOO MUCH ABOUT DIFFERENT THINGS: NEARLY EVERY DAY
7. FEELING AFRAID AS IF SOMETHING AWFUL MIGHT HAPPEN: SEVERAL DAYS
6. BECOMING EASILY ANNOYED OR IRRITABLE: NOT AT ALL
IF YOU CHECKED OFF ANY PROBLEMS ON THIS QUESTIONNAIRE, HOW DIFFICULT HAVE THESE PROBLEMS MADE IT FOR YOU TO DO YOUR WORK, TAKE CARE OF THINGS AT HOME, OR GET ALONG WITH OTHER PEOPLE: EXTREMELY DIFFICULT
5. BEING SO RESTLESS THAT IT IS HARD TO SIT STILL: NOT AT ALL
GAD7 TOTAL SCORE: 12
2. NOT BEING ABLE TO STOP OR CONTROL WORRYING: NEARLY EVERY DAY
1. FEELING NERVOUS, ANXIOUS, OR ON EDGE: NEARLY EVERY DAY

## 2018-12-18 ASSESSMENT — PATIENT HEALTH QUESTIONNAIRE - PHQ9
SUM OF ALL RESPONSES TO PHQ QUESTIONS 1-9: 5
5. POOR APPETITE OR OVEREATING: MORE THAN HALF THE DAYS

## 2018-12-18 NOTE — PROGRESS NOTES
SUBJECTIVE:    Elvia Howe is a 53 year old female who presents to the clinic for evaluation of anxiety and depression. Flare up due to job as aA [employer] . Had a crazy schedule this year. Wants to speak to a therapist. On Celexa 40mg and Wellbutrin 150 mg.    anxiety symptoms Tension, edginess, and irritability and Difficulty concentrating  Previous history of anxiety: yes  Previous history of medication therapy: yes  Previous history of suicide attempt: no  Current thoughts of suicide or homicide: no    Medication side effects: no    Family history of anxiety: not asked      Allergies   Allergen Reactions     Nkda [No Known Drug Allergies]        ROS: 10 point ROS neg other than the symptoms noted above in the HPI.  Past medical history, social history, chronic problem list, and medication list reviewed and updated today in EPIC as appropriate.      OBJECTIVE:  Vitals: BP (!) 156/95 (Cuff Size: Adult Large)   Pulse 90   Temp 98.1  F (36.7  C) (Oral)   Wt 111.1 kg (245 lb)   SpO2 96%   BMI 42.05 kg/m    BMI= Body mass index is 42.05 kg/m .    GENERAL: alert and in no apparent distress  MENTAL STATUS FINDINGS:  denies homicidal or suicidal behavior and well-dressed and groomed  HEATH 13  [place] 5    ASSESSMENT:  1. Adjustment disorder with mixed anxiety and depressed mood      Try Vistaril. Can cause drowsiness- try tonight to see how it affects her.    Discussed the pathophysiology of anxiety/depression episodes and the various symptoms seen associated with these episodes.  Discussed possible triggers including fatigue, depression, stress, and chemicals such as alcohol, caffeine and certain drugs.  Discussed the treatment including an aerobic exercise program, adequate rest, and both rescue meds and maintenance meds.    Follow up in within 2 weeks with Behavioral health specialist. week.    Shante Cisneros PA-C

## 2018-12-19 ASSESSMENT — ANXIETY QUESTIONNAIRES: GAD7 TOTAL SCORE: 12

## 2019-01-22 ENCOUNTER — TELEPHONE (OUTPATIENT)
Dept: FAMILY MEDICINE | Facility: CLINIC | Age: 54
End: 2019-01-22

## 2019-01-22 DIAGNOSIS — T75.3XXA MOTION SICKNESS, INITIAL ENCOUNTER: Primary | ICD-10-CM

## 2019-01-22 RX ORDER — SCOLOPAMINE TRANSDERMAL SYSTEM 1 MG/1
1 PATCH, EXTENDED RELEASE TRANSDERMAL
Qty: 3 PATCH | Refills: 0 | Status: SHIPPED | OUTPATIENT
Start: 2019-01-22 | End: 2019-01-25

## 2019-01-22 NOTE — TELEPHONE ENCOUNTER
Patient is requesting the patch for motion sickness for vacation    Please send a script to pharmacy    Thank you

## 2019-03-26 ENCOUNTER — TRANSFERRED RECORDS (OUTPATIENT)
Dept: HEALTH INFORMATION MANAGEMENT | Facility: CLINIC | Age: 54
End: 2019-03-26

## 2019-11-02 DIAGNOSIS — F33.1 MAJOR DEPRESSIVE DISORDER, RECURRENT EPISODE, MODERATE (H): ICD-10-CM

## 2019-11-02 NOTE — LETTER
November 5, 2019    Elvia Howe  [address] [facility] [address]    Dear Elvia,       We recently received a refill request for citalopram and bupropion.  We have refilled this for a one time 30 day supply only because you are due for a:    Anxiety, depression and physical office visit      Please call at your earliest convenience so that there will not be a delay with your future refills.          Thank you,   Your [facility] Team/  [phone]

## 2019-11-04 RX ORDER — BUPROPION HYDROCHLORIDE 150 MG/1
TABLET ORAL
Qty: 30 TABLET | Refills: 0 | Status: SHIPPED | OUTPATIENT
Start: 2019-11-04 | End: 2020-03-17

## 2019-11-04 RX ORDER — CITALOPRAM HYDROBROMIDE 40 MG/1
TABLET ORAL
Qty: 30 TABLET | Refills: 0 | Status: SHIPPED | OUTPATIENT
Start: 2019-11-04 | End: 2020-03-17

## 2019-11-04 NOTE — TELEPHONE ENCOUNTER
Prescription approved per [facility] [facility] [place]  APPT NEEDED FOR FURTHER REFILLS  Please help the pt schedule an appointment anxiety, depression, physical.    Health Maintenance Due   Topic Date Due     PREVENTIVE CARE VISIT  1965     HIV SCREENING  05/17/1981     ASTHMA ACTION PLAN  01/06/2018     ASTHMA CONTROL TEST  03/13/2019     PHQ-9  06/18/2019     INFLUENZA VACCINE (1) 09/01/2019     ZOSTER IMMUNIZATION (2 of 2) 11/08/2018     MAMMO SCREENING  10/14/2019     Denise Phillip RN

## 2020-02-23 ENCOUNTER — HEALTH MAINTENANCE LETTER (OUTPATIENT)
Age: 55
End: 2020-02-23

## 2020-03-17 ENCOUNTER — TELEPHONE (OUTPATIENT)
Dept: FAMILY MEDICINE | Facility: CLINIC | Age: 55
End: 2020-03-17

## 2020-03-17 DIAGNOSIS — F33.1 MAJOR DEPRESSIVE DISORDER, RECURRENT EPISODE, MODERATE (H): ICD-10-CM

## 2020-03-17 RX ORDER — CITALOPRAM HYDROBROMIDE 40 MG/1
40 TABLET ORAL DAILY
Qty: 90 TABLET | Refills: 0 | Status: SHIPPED | OUTPATIENT
Start: 2020-03-17 | End: 2020-08-19

## 2020-03-17 RX ORDER — BUPROPION HYDROCHLORIDE 150 MG/1
TABLET ORAL
Qty: 90 TABLET | Refills: 0 | Status: SHIPPED | OUTPATIENT
Start: 2020-03-17 | End: 2020-08-19

## 2020-03-17 NOTE — TELEPHONE ENCOUNTER
Reason for call:  Same Day Appointment   Requested Provider: Dr. Salamanca    PCP: [unfilled]    Reason for visit: medication check    Duration of symptoms: n/a    Have you been treated for this in the past? Yes    Additional comments: medication check      Phone number to reach patient:  Cell number on file:    Telephone Information:   Mobile [phone]       Best Time:  anytime    Can we leave a detailed message on this number?  YES    Travel screening: Not Applicable

## 2020-03-17 NOTE — TELEPHONE ENCOUNTER
Reason for Call:  Medication or medication refill:    Do you use a [facility]?  Name of the pharmacy and phone number for the current request:  [facility] [phone]    Name of the medication requested:   citalopram (CELEXA) 40 MG tablet  buPROPion (WELLBUTRIN XL) 150 MG 24 hr tablet    Other request: Per patient, medication is working well. Would like to limit exposure in clinic. If PHQ-9 is needed, would like mailed out.     Can we leave a detailed message on this number? YES    Phone number patient can be reached at: Home number on file [phone] (home)    Best Time:     Call taken on 3/17/2020 at 11:45 AM by Paula Lee

## 2020-03-17 NOTE — TELEPHONE ENCOUNTER
Patient informed of refills sent. phq-9 sent via [employer]. Patient is aware is overdue for an office visit and will schedule this summer    June EDDYN, RN, CPN

## 2020-05-12 ENCOUNTER — TRANSFERRED RECORDS (OUTPATIENT)
Dept: HEALTH INFORMATION MANAGEMENT | Facility: CLINIC | Age: 55
End: 2020-05-12

## 2020-08-19 ENCOUNTER — VIRTUAL VISIT (OUTPATIENT)
Dept: FAMILY MEDICINE | Facility: CLINIC | Age: 55
End: 2020-08-19
Payer: COMMERCIAL

## 2020-08-19 DIAGNOSIS — F33.1 MAJOR DEPRESSIVE DISORDER, RECURRENT EPISODE, MODERATE (H): ICD-10-CM

## 2020-08-19 DIAGNOSIS — G47.411 PRIMARY NARCOLEPSY WITH CATAPLEXY: Primary | ICD-10-CM

## 2020-08-19 PROCEDURE — 99214 OFFICE O/P EST MOD 30 MIN: CPT | Mod: GT | Performed by: FAMILY MEDICINE

## 2020-08-19 RX ORDER — CITALOPRAM HYDROBROMIDE 40 MG/1
40 TABLET ORAL DAILY
Qty: 90 TABLET | Refills: 3 | Status: SHIPPED | OUTPATIENT
Start: 2020-08-19

## 2020-08-19 NOTE — PROGRESS NOTES
"Elvia Howe is a 55 year old female who is being evaluated via a billable video visit.      The patient has been notified of following:     \"This video visit will be conducted via a call between you and your physician/provider. We have found that certain health care needs can be provided without the need for an in-person physical exam.  This service lets us provide the care you need with a video conversation.  If a prescription is necessary we can send it directly to your pharmacy.  If lab work is needed we can place an order for that and you can then stop by our lab to have the test done at a later time.    Video visits are billed at different rates depending on your insurance coverage.  Please reach out to your insurance provider with any questions.    If during the course of the call the physician/provider feels a video visit is not appropriate, you will not be charged for this service.\"    Patient has given verbal consent for Video visit? Yes  How would you like to obtain your AVS? MyChart  If you are dropped from the video visit, the video invite should be resent to: Text to cell phone: [phone]  Will anyone else be joining your video visit? No      Subjective     Elvia Howe is a 55 year old female who presents today via video visit for the following health issues:    HPI    Video Start Time: 2:23 PM      Please note: only the issues listed at the bottom under Assessment and Plan are addressed today. The rest of the medical problems are listed for the sake of completeness.      Narcoplexy/DOLORES - she has sleepiness during the day and has a difficult time staying awake. She falls asleep during the day despite sleeping well at night.   Evaluation and treatment:   She has been dx'd with Narcolepsy/cataplexy. Her daughter has similar dx.   She follows [facility]   She uses CPAP   Modafinil helped initially but not later.   Adderall same   She is frustrated " with this issue and would like a referral to [facility].   She gave me her [facility] patient number: [id number]   Their phone number is: [phone] - fax: [phone]    Fax for the sleep clinic is: [phone]   I made the referral for her.    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    Obesity -   Evaluation and treatment:    diet and exercise discussed. Declines referral to nutrition.    Phentermine previously every 2 days was working well. If she takes it daily then she can't think clearly.    I told her we couldn't prescribe Phentermine with Adderall or other stimulants - but he wants one month of Phentermine and she promises not to use with Adderall - I approved it.   We can try Topamax and discussed side effects. She said she will think about it and let me know.    Wt Readings from Last 5 Encounters:   12/18/18 111.1 kg (245 lb)   09/13/18 108 kg (238 lb)   01/12/18 112 kg (247 lb)   03/09/16 104.3 kg (230 lb)   09/24/15 109.8 kg (242 lb)     07/29/13  258 lb (117.028 kg)      01/24/13   276 lb (125.193 kg)       Asthma, moderate persistent - Duration: since around 2002. Symptoms: Cough, shortness of breath, wheezing. H/O hospitalization: no. H/O steroid treatment: yes. Smoker: no. Triggers are environmental allergies.  Evaluation and treatment:    uses abl rarely - gets from [facility].   Advair 250/50   Counseling: previously done today about the detriments of uncontrolled asthma, proper inhaler technique.    ACT Total Scores 9/13/2018   ACT TOTAL SCORE -   ASTHMA ER VISITS -   ASTHMA HOSPITALIZATIONS -   ACT TOTAL SCORE (Goal Greater than or Equal to 20) 21   In the past 12 months, how many times did you visit the emergency room for your asthma without being admitted to the hospital? 0   In the past 12 months, how many times were you hospitalized overnight because of your asthma? 0       Environmental Allergies - her symptoms are year round and include itchy, runny eyes, nose, sneezing. Loratadine helps.  Zyrtec and Singulair no help. I had referred her to allergy but she did not go.     Anxiety/depression - symptoms are low mood, worry easily overwhelmed, low energy. No SI or HI.  Evaluation and treatment:    Celexa 40 mg qd and Wellbutrin 150 mg qd. No side effects.   No longer on Buspar - not needed.   Continue same tx.    PHQ-9 SCORE 9/13/2018 12/18/2018 3/17/2020   PHQ-9 Total Score - - -   PHQ-9 Total Score MyChart - - 1 (Minimal depression)   PHQ-9 Total Score 0 5 1     HEATH-7 SCORE 2/28/2017 1/12/2018 12/18/2018   Total Score - - -   Total Score 2 3 12       Neck pain - previously muscle relaxer helped. No issues now.     Knee pain - had steroid shots per ortho.      Vit D 2000 units per day but has not been taking it. I encouraged her to do that.     Diarrhea - She tells me her symptoms have resolved since using probiotics. Not needing any more.    Previous h/o sea sickness - Scop patch prn helps.      Preventive - Declines Prevnar. I asked her to get Flu shot and Shingles at our pharmacy which she did.    Immunization History   Administered Date(s) Administered     HepB 06/13/2011, 11/08/2011     HepB-Adult 01/12/2018     Influenza (IIV3) PF 10/08/2012, 11/14/2017     Influenza Quad, Recombinant, p-free (RIV4) 09/13/2018     Influenza Vaccine IM > 6 months Valent IIV4 12/11/2014, 11/04/2015, 11/09/2016     Pneumococcal 23 valent 11/08/2011, 10/08/2012     TDAP Vaccine (Adacel) 06/13/2011     Zoster vaccine recombinant adjuvanted (SHINGRIX) 09/13/2018     Lipids screen:    Recent Labs   Lab Test  09/24/15   1117  08/28/13   1119   CHOL  172  169   HDL  51  50   LDL  102  105   TRIG  95  69   CHOLHDLRATIO  3.4  3.4       PAP - has had hysterectomy, still has cervix - declines PAP.    Mammogram: 10/14/17 negative.    Colon cancer screen: colonoscopy 12/20/17 - repeat in 3 years    ROS:    Const: No fevers, weight changes or night sweats recently.  ENT: No runny nose, sore throat or ear pain.  Resp: No cough or  shortness of breath.  CV: No chest pain, dizziness or cardiac palpitations.  GI: No nausea, vomiting, diarrhea or constipation. Denies blood in stools or black stools.  : No dysuria, frequency or hematuria.  The rest of the ROS negative, other than listed on HPI      SH:    Marital status:   Kids: 2  Employment: life insurance exams  Exercise: swimming 4 days per week for 30 minutes per time.  Tobacco: no  Etoh: 1 every 2 months  Recreational drugs: denies  Caffeine: 3 pops per day.    Exam:    There were no vitals taken for this visit.    Gen: on video, healthy appearing in NAD.    Assessment and Plan - Decision Making    1. Primary narcolepsy with cataplexy    Per HPI      2. Major depressive disorder, recurrent episode, moderate (H)    Per HPI    - citalopram (CELEXA) 40 MG tablet; Take 1 tablet (40 mg) by mouth daily  Dispense: 90 tablet; Refill: 3      Written instructions given as follows:    Patient Instructions   See you for a physical with fasting labs.        Video-Visit Details    Type of service:  Video Visit    Video End Time: 2: 35 PM    Originating Location (pt. Location): Home    Distant Location (provider location):  [facility]     Platform used for Video Visit: [facility]

## 2020-08-21 ENCOUNTER — MYC MEDICAL ADVICE (OUTPATIENT)
Dept: FAMILY MEDICINE | Facility: CLINIC | Age: 55
End: 2020-08-21

## 2020-09-02 ENCOUNTER — VIRTUAL VISIT (OUTPATIENT)
Dept: FAMILY MEDICINE | Facility: CLINIC | Age: 55
End: 2020-09-02
Payer: COMMERCIAL

## 2020-09-02 DIAGNOSIS — Z13.220 SCREENING CHOLESTEROL LEVEL: ICD-10-CM

## 2020-09-02 DIAGNOSIS — E55.9 VITAMIN D DEFICIENCY: Primary | ICD-10-CM

## 2020-09-02 DIAGNOSIS — R53.83 OTHER FATIGUE: ICD-10-CM

## 2020-09-02 PROCEDURE — 99214 OFFICE O/P EST MOD 30 MIN: CPT | Mod: TEL | Performed by: FAMILY MEDICINE

## 2020-09-02 ASSESSMENT — ANXIETY QUESTIONNAIRES
IF YOU CHECKED OFF ANY PROBLEMS ON THIS QUESTIONNAIRE, HOW DIFFICULT HAVE THESE PROBLEMS MADE IT FOR YOU TO DO YOUR WORK, TAKE CARE OF THINGS AT HOME, OR GET ALONG WITH OTHER PEOPLE: NOT DIFFICULT AT ALL
1. FEELING NERVOUS, ANXIOUS, OR ON EDGE: NOT AT ALL
3. WORRYING TOO MUCH ABOUT DIFFERENT THINGS: NOT AT ALL
7. FEELING AFRAID AS IF SOMETHING AWFUL MIGHT HAPPEN: NOT AT ALL
2. NOT BEING ABLE TO STOP OR CONTROL WORRYING: NOT AT ALL
GAD7 TOTAL SCORE: 0
5. BEING SO RESTLESS THAT IT IS HARD TO SIT STILL: NOT AT ALL
6. BECOMING EASILY ANNOYED OR IRRITABLE: NOT AT ALL

## 2020-09-02 ASSESSMENT — PATIENT HEALTH QUESTIONNAIRE - PHQ9
SUM OF ALL RESPONSES TO PHQ QUESTIONS 1-9: 0
5. POOR APPETITE OR OVEREATING: NOT AT ALL

## 2020-09-02 NOTE — PROGRESS NOTES
"Elvia Howe is a 55 year old female who is being evaluated via a billable video visit.      The patient has been notified of following:     \"This video visit will be conducted via a call between you and your physician/provider. We have found that certain health care needs can be provided without the need for an in-person physical exam.  This service lets us provide the care you need with a video conversation.  If a prescription is necessary we can send it directly to your pharmacy.  If lab work is needed we can place an order for that and you can then stop by our lab to have the test done at a later time.    Video visits are billed at different rates depending on your insurance coverage.  Please reach out to your insurance provider with any questions.    If during the course of the call the physician/provider feels a video visit is not appropriate, you will not be charged for this service.\"    Patient has given verbal consent for Video visit? Yes  How would you like to obtain your AVS? MyChart  If you are dropped from the video visit, the video invite should be resent to: {video visit invitation:216545}  Will anyone else be joining your video visit? {:966958}  {If patient encounters technical issues they should call [phone] :558526}    Subjective     Elvia Howe is a 55 year old female who presents today via video visit for the following health issues:    HPI    {SUPERLIST (Optional):124910}  {PEDS Chronic and Acute Problems (Optional):844253}     Video Start Time: {video visit start/end time for provider to select:798977}    {additonal problems for provider to add (Optional):823120}    Review of Systems   {ROS COMP (Optional):401842}      Objective           Vitals:  No vitals were obtained today due to virtual visit.    Physical Exam     {video visit exam brief selected:018929::\"GENERAL: Healthy, alert and no distress\",\"EYES: Eyes grossly normal to inspection.  No discharge or erythema, or " "obvious scleral/conjunctival abnormalities.\",\"RESP: No audible wheeze, cough, or visible cyanosis.  No visible retractions or increased work of breathing.  \",\"SKIN: Visible skin clear. No significant rash, abnormal pigmentation or lesions.\",\"NEURO: Cranial nerves grossly intact.  Mentation and speech appropriate for age.\",\"PSYCH: Mentation appears normal, affect normal/bright, judgement and insight intact, normal speech and appearance well-groomed.\"}      {Diagnostic Test Results (Optional):883847}        {PROVIDER CHARTING PREFERENCE:902175}      Video-Visit Details    Type of service:  Video Visit    Video End Time:{video visit start/end time for provider to select:152948}    Originating Location (pt. Location): {video visit patient location:501531::\"Home\"}    Distant Location (provider location):  [facility]     Platform used for Video Visit: {Virtual Visit Platforms:796592::\"[facility]\"}          "

## 2020-09-02 NOTE — PROGRESS NOTES
"HPI:    Elvia Howe is a 55 year old female who is being evaluated via a billable video visit.      The patient has been notified of following:     \"This video visit will be conducted via a call between you and your physician/provider. We have found that certain health care needs can be provided without the need for an in-person physical exam.  This service lets us provide the care you need with a video conversation.  If a prescription is necessary we can send it directly to your pharmacy.  If lab work is needed we can place an order for that and you can then stop by our lab to have the test done at a later time.    Video visits are billed at different rates depending on your insurance coverage.  Please reach out to your insurance provider with any questions.    If during the course of the call the physician/provider feels a video visit is not appropriate, you will not be charged for this service.\"    Patient has given verbal consent for Video visit? Yes  How would you like to obtain your AVS? MyChart  If you are dropped from the video visit, the video invite should be resent to: Text to cell phone: [phone]  Will anyone else be joining your video visit? No      Subjective     Elvia Howe is a 55 year old female who presents today via video visit for the following health issues:      Video Start Time: 2:23 PM      Please note: only the issues listed at the bottom under Assessment and Plan are addressed today. The rest of the medical problems are listed for the sake of completeness.      Narcoplexy/DOLORES - she has sleepiness during the day and has a difficult time staying awake. She falls asleep during the day despite sleeping well at night.   Evaluation and treatment:   She has been dx'd with Narcolepsy/cataplexy. Her daughter has similar dx.   She [facility] with [facility].   She uses CPAP   Modafinil helped initially but not later.   Adderall same   She is frustrated " with this issue and would like a referral to [facility].   She gave me her [facility] patient number: [phone]   Their phone number is: [phone] - fax: [phone]    Fax for the sleep clinic is: [phone]   I previously made the referral for her - she is waiting to hear from them.   She wants some labs for fatigue - ordered CMP, CBC, TSH and vit D.    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    Obesity -   Evaluation and treatment:    diet and exercise discussed. Declines referral to nutrition.    Phentermine previously every 2 days was working well. If she takes it daily then she can't think clearly.    I told her we couldn't prescribe Phentermine with Adderall or other stimulants,   We can try Topamax and discussed side effects. She said she will think about it and let me know.    Wt Readings from Last 5 Encounters:   12/18/18 111.1 kg (245 lb)   09/13/18 108 kg (238 lb)   01/12/18 112 kg (247 lb)   03/09/16 104.3 kg (230 lb)   09/24/15 109.8 kg (242 lb)     07/29/13  258 lb (117.028 kg)      01/24/13   276 lb (125.193 kg)       Asthma, moderate persistent - Duration: since around 2002. Symptoms: Cough, shortness of breath, wheezing. H/O hospitalization: no. H/O steroid treatment: yes. Smoker: no. Triggers are environmental allergies.  Evaluation and treatment:    uses abl rarely - gets from [facility].   Advair 250/50   Counseling: previously done today about the detriments of uncontrolled asthma, proper inhaler technique.    ACT Total Scores 9/13/2018   ACT TOTAL SCORE -   ASTHMA ER VISITS -   ASTHMA HOSPITALIZATIONS -   ACT TOTAL SCORE (Goal Greater than or Equal to 20) 21   In the past 12 months, how many times did you visit the emergency room for your asthma without being admitted to the hospital? 0   In the past 12 months, how many times were you hospitalized overnight because of your asthma? 0       Environmental Allergies - her symptoms are year round and include itchy, runny eyes, nose, sneezing.  Loratadine helps. Zyrtec and Singulair no help. I had referred her to allergy but she did not go.     Anxiety/depression - symptoms are low mood, worry easily overwhelmed, low energy. No SI or HI.  Evaluation and treatment:    Celexa 40 mg qd and Wellbutrin 150 mg qd. No side effects.   No longer on Buspar - not needed.   Continue same tx.    PHQ-9 SCORE 12/18/2018 3/17/2020 9/2/2020   PHQ-9 Total Score - - -   PHQ-9 Total Score MyChart - 1 (Minimal depression) -   PHQ-9 Total Score 5 1 0     HEATH-7 SCORE 1/12/2018 12/18/2018 9/2/2020   Total Score - - -   Total Score 3 12 0     Neck pain - previously muscle relaxer helped. No issues now.     Knee pain - had steroid shots per ortho.     Vit D deficiency -   Evaluation and treatment:    she is supposed to be on 2000 units per day but has not been taking it. I encouraged her to do that.    I ordered future labs.    Diarrhea - She tells me her symptoms have resolved since using probiotics. Not needing any more.    Previous h/o sea sickness - Scop patch prn helps.      Preventive - Declines Prevnar. I asked her to get Flu shot and Shingles at our pharmacy.    Immunization History   Administered Date(s) Administered     HepB 06/13/2011, 11/08/2011     HepB-Adult 01/12/2018     Influenza (IIV3) PF 10/08/2012, 11/14/2017     Influenza Quad, Recombinant, p-free (RIV4) 09/13/2018     Influenza Vaccine IM > 6 months Valent IIV4 12/11/2014, 11/04/2015, 11/09/2016     Pneumococcal 23 valent 11/08/2011, 10/08/2012     TDAP Vaccine (Adacel) 06/13/2011     Zoster vaccine recombinant adjuvanted (SHINGRIX) 09/13/2018     Lipids screen: ordered future labs.    Recent Labs   Lab Test  09/24/15   1117  08/28/13   1119   CHOL  172  169   HDL  51  50   LDL  102  105   TRIG  95  69   CHOLHDLRATIO  3.4  3.4       PAP - has had hysterectomy, still has cervix - declines PAP.    Mammogram: 10/14/17 negative.    Colon cancer screen: colonoscopy 12/20/17 - repeat in 3 years    ROS:    Const: No  fevers, weight changes or night sweats recently.  ENT: No runny nose, sore throat or ear pain.  Resp: No cough or shortness of breath.  CV: No chest pain, dizziness or cardiac palpitations.  GI: No nausea, vomiting, diarrhea or constipation. Denies blood in stools or black stools.  : No dysuria, frequency or hematuria.  The rest of the ROS negative, other than listed on HPI      SH:    Marital status:   Kids: 2  Employment: life insurance exams  Exercise: swimming 4 days per week for 30 minutes per time.  Tobacco: no  Etoh: 1 every 2 months  Recreational drugs: denies  Caffeine: 3 pops per day.    Exam:    There were no vitals taken for this visit.    Gen: on video, healthy appearing in NAD.    Assessment and Plan - Decision Making    1. Vitamin D deficiency    Per HPI    - Vitamin D Deficiency; Future    2. Other fatigue    Per HPI    - Comprehensive metabolic panel (BMP + Alb, Alk Phos, ALT, AST, Total. Bili, TP); Future  - CBC with platelets and differential; Future  - TSH with free T4 reflex; Future    3. Screening cholesterol level    Per HPI    - Lipid panel reflex to direct LDL Fasting; Future      Written instructions given as follows:    There are no Patient Instructions on file for this visit.    Video-Visit Details    Type of service:  Video Visit    Video End Time: 2: 35 PM    Originating Location (pt. Location): Home    Distant Location (provider location):  [facility]     Platform used for Video Visit: [employer]

## 2020-09-03 ASSESSMENT — ANXIETY QUESTIONNAIRES: GAD7 TOTAL SCORE: 0

## 2020-09-03 NOTE — PATIENT INSTRUCTIONS
1. Let me know if I need to follow up on the referral to [facility].    2. I know you would rather not come in for a physical so I have ordered fasting labs for you - I will contact you when I get the results.

## 2020-09-04 NOTE — TELEPHONE ENCOUNTER
Faxed last 2 recent encounters from Dr Salamanca to the [facility] @ [phone].Chelita Villagran MA/SHERIN

## 2020-09-04 NOTE — TELEPHONE ENCOUNTER
MARISOL @ [facility] CALLING STATING THEY NEEDS RECORDS FOR THE DOCTOR TO VIEW. PLEASE CALL TO ADVISE.       FAX: [phone]

## 2020-09-16 ENCOUNTER — TELEPHONE (OUTPATIENT)
Dept: FAMILY MEDICINE | Facility: CLINIC | Age: 55
End: 2020-09-16

## 2020-09-16 NOTE — TELEPHONE ENCOUNTER
Lester is calling from [facility], stating that they need a current medication list and if Elvia has had previous sleep studies to review.  Please fax to:  [phone] ATTN: [facility]   Please call with questions.  Thank you

## 2020-09-16 NOTE — TELEPHONE ENCOUNTER
I do not see a sleep study in the patient's chart. It looks like her sleep apnea is being followed at [facility].     Faxed medication list to [facility] @ [phone], Attn: Scottie Villagran MA/SHERIN

## 2020-12-06 ENCOUNTER — HEALTH MAINTENANCE LETTER (OUTPATIENT)
Age: 55
End: 2020-12-06

## 2021-04-11 ENCOUNTER — HEALTH MAINTENANCE LETTER (OUTPATIENT)
Age: 56
End: 2021-04-11

## 2021-05-15 ENCOUNTER — TRANSFERRED RECORDS (OUTPATIENT)
Dept: HEALTH INFORMATION MANAGEMENT | Facility: CLINIC | Age: 56
End: 2021-05-15

## 2021-09-26 ENCOUNTER — HEALTH MAINTENANCE LETTER (OUTPATIENT)
Age: 56
End: 2021-09-26

## 2022-05-07 ENCOUNTER — HEALTH MAINTENANCE LETTER (OUTPATIENT)
Age: 57
End: 2022-05-07

## 2022-11-27 ENCOUNTER — TRANSFERRED RECORDS (OUTPATIENT)
Dept: FAMILY MEDICINE | Facility: CLINIC | Age: 57
End: 2022-11-27

## 2023-01-08 ENCOUNTER — HEALTH MAINTENANCE LETTER (OUTPATIENT)
Age: 58
End: 2023-01-08

## 2023-04-23 ENCOUNTER — HEALTH MAINTENANCE LETTER (OUTPATIENT)
Age: 58
End: 2023-04-23

## 2023-06-02 ENCOUNTER — HEALTH MAINTENANCE LETTER (OUTPATIENT)
Age: 58
End: 2023-06-02

## (undated) DEVICE — SOL WATER IRRIG 1000ML BOTTLE 07139-09

## (undated) RX ORDER — FENTANYL CITRATE 50 UG/ML
INJECTION, SOLUTION INTRAMUSCULAR; INTRAVENOUS
Status: DISPENSED
Start: 2017-12-20